# Patient Record
Sex: MALE | Race: WHITE | Employment: OTHER | URBAN - NONMETROPOLITAN AREA
[De-identification: names, ages, dates, MRNs, and addresses within clinical notes are randomized per-mention and may not be internally consistent; named-entity substitution may affect disease eponyms.]

---

## 2023-02-21 ENCOUNTER — OFFICE VISIT (OUTPATIENT)
Dept: FAMILY MEDICINE CLINIC | Age: 73
End: 2023-02-21
Payer: MEDICARE

## 2023-02-21 VITALS
HEART RATE: 103 BPM | OXYGEN SATURATION: 95 % | HEIGHT: 68 IN | TEMPERATURE: 98.2 F | RESPIRATION RATE: 18 BRPM | SYSTOLIC BLOOD PRESSURE: 182 MMHG | BODY MASS INDEX: 24.1 KG/M2 | WEIGHT: 159 LBS | DIASTOLIC BLOOD PRESSURE: 110 MMHG

## 2023-02-21 DIAGNOSIS — R09.81 SINUS CONGESTION: Primary | ICD-10-CM

## 2023-02-21 DIAGNOSIS — I16.0 HYPERTENSIVE URGENCY: ICD-10-CM

## 2023-02-21 DIAGNOSIS — R51.9 ACUTE NONINTRACTABLE HEADACHE, UNSPECIFIED HEADACHE TYPE: ICD-10-CM

## 2023-02-21 PROCEDURE — 1036F TOBACCO NON-USER: CPT | Performed by: PHYSICIAN ASSISTANT

## 2023-02-21 PROCEDURE — G8484 FLU IMMUNIZE NO ADMIN: HCPCS | Performed by: PHYSICIAN ASSISTANT

## 2023-02-21 PROCEDURE — 1123F ACP DISCUSS/DSCN MKR DOCD: CPT | Performed by: PHYSICIAN ASSISTANT

## 2023-02-21 PROCEDURE — 99205 OFFICE O/P NEW HI 60 MIN: CPT | Performed by: PHYSICIAN ASSISTANT

## 2023-02-21 PROCEDURE — G8427 DOCREV CUR MEDS BY ELIG CLIN: HCPCS | Performed by: PHYSICIAN ASSISTANT

## 2023-02-21 PROCEDURE — G8420 CALC BMI NORM PARAMETERS: HCPCS | Performed by: PHYSICIAN ASSISTANT

## 2023-02-21 PROCEDURE — 3080F DIAST BP >= 90 MM HG: CPT | Performed by: PHYSICIAN ASSISTANT

## 2023-02-21 PROCEDURE — 3017F COLORECTAL CA SCREEN DOC REV: CPT | Performed by: PHYSICIAN ASSISTANT

## 2023-02-21 PROCEDURE — 3077F SYST BP >= 140 MM HG: CPT | Performed by: PHYSICIAN ASSISTANT

## 2023-02-21 RX ORDER — LISINOPRIL 20 MG/1
TABLET ORAL
COMMUNITY

## 2023-02-21 RX ORDER — GEMFIBROZIL 600 MG/1
TABLET, FILM COATED ORAL
COMMUNITY
Start: 2009-03-02

## 2023-02-21 RX ORDER — MONTELUKAST SODIUM 10 MG/1
TABLET ORAL
COMMUNITY
Start: 2016-03-23

## 2023-02-21 RX ORDER — FLUTICASONE PROPIONATE 50 MCG
SPRAY, SUSPENSION (ML) NASAL
COMMUNITY
Start: 2015-06-03

## 2023-02-21 RX ORDER — ATORVASTATIN CALCIUM 40 MG/1
TABLET, FILM COATED ORAL
COMMUNITY
Start: 2009-03-02

## 2023-02-21 RX ORDER — AMOXICILLIN 875 MG/1
875 TABLET, COATED ORAL 2 TIMES DAILY
Qty: 20 TABLET | Refills: 0 | Status: SHIPPED | OUTPATIENT
Start: 2023-02-21 | End: 2023-03-03

## 2023-02-21 ASSESSMENT — ENCOUNTER SYMPTOMS
PHOTOPHOBIA: 0
SINUS PRESSURE: 1
SHORTNESS OF BREATH: 0
DIARRHEA: 0
NAUSEA: 0
ABDOMINAL PAIN: 0
VOMITING: 0
BACK PAIN: 0
SORE THROAT: 0
COUGH: 0

## 2023-02-21 NOTE — PROGRESS NOTES
23  Thu Morton : 1950 Sex: male  Age 67 y.o. Subjective:  Chief Complaint   Patient presents with    Sinus Problem         79-year-old male presents to the walk-in clinic for evaluation of possible sinus infection. He states that he gets them regularly but has been doing well this winter because he started using a cool-mist humidifier. He states for 2 weeks has had sinus congestion pressure and drainage. He is taking over-the-counter Steffi-[place]'s day and night for sinuses. He is also using Flonase. Patient does have extremely high blood pressure in the office during triage. He states that he is not taken his blood pressure medication in several years. Prior to that he had been on lisinopril 20 mg daily. He currently does not have a family physician. He does admit to having a headache for the last 2 weeks. He denies visual disturbances. No lateralizing weakness. No shortness of breath or chest pain. Review of Systems   Constitutional:  Negative for chills and fever. HENT:  Positive for congestion and sinus pressure. Negative for ear pain and sore throat. Eyes:  Negative for photophobia and visual disturbance. Respiratory:  Negative for cough and shortness of breath. Cardiovascular:  Negative for chest pain. Gastrointestinal:  Negative for abdominal pain, diarrhea, nausea and vomiting. Genitourinary:  Negative for difficulty urinating, dysuria, frequency and urgency. Musculoskeletal:  Negative for back pain, neck pain and neck stiffness. Skin:  Negative for rash. Neurological:  Positive for headaches. Negative for dizziness, syncope, weakness and light-headedness. Hematological:  Negative for adenopathy. Does not bruise/bleed easily. Psychiatric/Behavioral:  Negative for agitation and confusion. All other systems reviewed and are negative. PMH:   History reviewed. No pertinent past medical history. History reviewed.  No pertinent surgical history. History reviewed. No pertinent family history. Medications:     Current Outpatient Medications:     OMEPRAZOLE PO, Take by mouth, Disp: , Rfl:     fluticasone (FLONASE) 50 MCG/ACT nasal spray, , Disp: , Rfl:     amoxicillin (AMOXIL) 875 MG tablet, Take 1 tablet by mouth 2 times daily for 10 days, Disp: 20 tablet, Rfl: 0    atorvastatin (LIPITOR) 40 MG tablet, Take by mouth (Patient not taking: Reported on 2/21/2023), Disp: , Rfl:     gemfibrozil (LOPID) 600 MG tablet, Take by mouth (Patient not taking: Reported on 2/21/2023), Disp: , Rfl:     lisinopril (PRINIVIL;ZESTRIL) 20 MG tablet, Take by mouth (Patient not taking: Reported on 2/21/2023), Disp: , Rfl:     montelukast (SINGULAIR) 10 MG tablet, , Disp: , Rfl:     Allergies:   No Known Allergies    Social History:     Social History     Tobacco Use    Smoking status: Never    Smokeless tobacco: Never       Patient lives at home. Physical Exam:     Vitals:    02/21/23 1346 02/21/23 1353   BP: (!) 174/106 (!) 182/110   Pulse: (!) 103    Resp: 18    Temp: 98.2 °F (36.8 °C)    TempSrc: Temporal    SpO2: 95%    Weight: 159 lb (72.1 kg)    Height: 5' 8\" (1.727 m)        Exam:  Physical Exam  Vitals and nursing note reviewed. Constitutional:       General: He is not in acute distress. Appearance: He is well-developed. HENT:      Head: Normocephalic and atraumatic. Right Ear: Tympanic membrane normal.      Left Ear: Tympanic membrane normal.      Nose: Nose normal.      Mouth/Throat:      Mouth: Mucous membranes are moist.      Pharynx: Oropharynx is clear. Eyes:      Conjunctiva/sclera: Conjunctivae normal.      Pupils: Pupils are equal, round, and reactive to light. Cardiovascular:      Rate and Rhythm: Normal rate and regular rhythm. Pulmonary:      Effort: Pulmonary effort is normal. No respiratory distress. Breath sounds: Normal breath sounds.    Abdominal:      General: Bowel sounds are normal.      Palpations: Abdomen is soft.      Tenderness: There is no abdominal tenderness. Musculoskeletal:         General: Normal range of motion. Cervical back: Normal range of motion. No rigidity. Lymphadenopathy:      Cervical: No cervical adenopathy. Skin:     General: Skin is warm and dry. Neurological:      General: No focal deficit present. Mental Status: He is alert and oriented to person, place, and time. Mental status is at baseline. Psychiatric:         Mood and Affect: Mood normal.         Behavior: Behavior normal.         Thought Content: Thought content normal.         Judgment: Judgment normal.         Testing:           Medical Decision Making:     Patient upon arrival did not appear toxic or lethargic. Vital signs were reviewed. Past medical history reviewed. Allergies reviewed. Medications reviewed. Patient is presenting with the above complaint of sinus congestion. Differential diagnosis was discussed with the patient. We will address his sinus congestion today in the office. Patient will be given a prescription for amoxicillin. Patient may use over-the-counter analgesics and decongestants that are safe for high blood pressure as needed. During triage patient's blood pressure was significantly elevated. Repeat value was obtained after he had been sitting and resting for several minutes with a similar value. Patient was instructed that with his symptoms of headache with his high blood pressure he will need to go directly to the emergency department for more emergent evaluation. Patient understands and is agreeable. He does not want transport by EMS. Patient currently does not have a primary care physician. He was instructed to call in and establish for ER follow-up and blood pressure monitoring. He understands the risks of driving by private vehicle as well as not going to the emergency department including MVA, injury, decompensation, disability and death.       Clinical Impression:   Srini Cross was seen today for sinus problem. Diagnoses and all orders for this visit:    Sinus congestion    Hypertensive urgency    Acute nonintractable headache, unspecified headache type    Other orders  -     amoxicillin (AMOXIL) 875 MG tablet; Take 1 tablet by mouth 2 times daily for 10 days      Go directly to the emergency department. SIGNATURE: Ana Wray PA-C

## 2023-10-16 ENCOUNTER — APPOINTMENT (OUTPATIENT)
Dept: GENERAL RADIOLOGY | Age: 73
End: 2023-10-16
Payer: MEDICARE

## 2023-10-16 ENCOUNTER — APPOINTMENT (OUTPATIENT)
Dept: CT IMAGING | Age: 73
End: 2023-10-16
Payer: MEDICARE

## 2023-10-16 ENCOUNTER — HOSPITAL ENCOUNTER (OUTPATIENT)
Age: 73
Setting detail: OBSERVATION
Discharge: HOME | End: 2023-10-17
Attending: STUDENT IN AN ORGANIZED HEALTH CARE EDUCATION/TRAINING PROGRAM | Admitting: INTERNAL MEDICINE
Payer: MEDICARE

## 2023-10-16 DIAGNOSIS — R00.0 TACHYCARDIA: Primary | ICD-10-CM

## 2023-10-16 LAB
ALBUMIN SERPL-MCNC: 4.7 G/DL (ref 3.5–5.2)
ALP SERPL-CCNC: 57 U/L (ref 40–129)
ALT SERPL-CCNC: 22 U/L (ref 0–40)
AMPHET UR QL SCN: NEGATIVE
ANION GAP SERPL CALCULATED.3IONS-SCNC: 12 MMOL/L (ref 7–16)
ANION GAP SERPL CALCULATED.3IONS-SCNC: 12 MMOL/L (ref 7–16)
AST SERPL-CCNC: 30 U/L (ref 0–39)
B PARAP IS1001 DNA NPH QL NAA+NON-PROBE: NOT DETECTED
B PERT DNA SPEC QL NAA+PROBE: NOT DETECTED
BARBITURATES UR QL SCN: NEGATIVE
BASOPHILS # BLD: 0.02 K/UL (ref 0–0.2)
BASOPHILS NFR BLD: 0 % (ref 0–2)
BENZODIAZ UR QL: NEGATIVE
BILIRUB SERPL-MCNC: 0.6 MG/DL (ref 0–1.2)
BILIRUB UR QL STRIP: NEGATIVE
BNP SERPL-MCNC: 1376 PG/ML (ref 0–125)
BUN SERPL-MCNC: 14 MG/DL (ref 6–23)
BUN SERPL-MCNC: 17 MG/DL (ref 6–23)
BUPRENORPHINE UR QL: NEGATIVE
C PNEUM DNA NPH QL NAA+NON-PROBE: NOT DETECTED
CALCIUM SERPL-MCNC: 10 MG/DL (ref 8.6–10.2)
CALCIUM SERPL-MCNC: 9.6 MG/DL (ref 8.6–10.2)
CANNABINOIDS UR QL SCN: NEGATIVE
CHLORIDE SERPL-SCNC: 105 MMOL/L (ref 98–107)
CHLORIDE SERPL-SCNC: 98 MMOL/L (ref 98–107)
CHOLEST SERPL-MCNC: 221 MG/DL
CLARITY UR: CLEAR
CO2 SERPL-SCNC: 21 MMOL/L (ref 22–29)
CO2 SERPL-SCNC: 25 MMOL/L (ref 22–29)
COCAINE UR QL SCN: NEGATIVE
COLOR UR: YELLOW
COMMENT: ABNORMAL
CREAT SERPL-MCNC: 0.8 MG/DL (ref 0.7–1.2)
CREAT SERPL-MCNC: 1 MG/DL (ref 0.7–1.2)
D DIMER: 323 NG/ML DDU (ref 0–232)
EKG ATRIAL RATE: 120 BPM
EKG P AXIS: 64 DEGREES
EKG P-R INTERVAL: 142 MS
EKG Q-T INTERVAL: 328 MS
EKG QRS DURATION: 108 MS
EKG QTC CALCULATION (BAZETT): 463 MS
EKG R AXIS: 78 DEGREES
EKG T AXIS: 40 DEGREES
EKG VENTRICULAR RATE: 120 BPM
EOSINOPHIL # BLD: 0.04 K/UL (ref 0.05–0.5)
EOSINOPHILS RELATIVE PERCENT: 0 % (ref 0–6)
ERYTHROCYTE [DISTWIDTH] IN BLOOD BY AUTOMATED COUNT: 12.1 % (ref 11.5–15)
FENTANYL UR QL: NEGATIVE
FLUAV RNA NPH QL NAA+NON-PROBE: NOT DETECTED
FLUBV RNA NPH QL NAA+NON-PROBE: NOT DETECTED
GFR SERPL CREATININE-BSD FRML MDRD: >60 ML/MIN/1.73M2
GFR SERPL CREATININE-BSD FRML MDRD: >60 ML/MIN/1.73M2
GLUCOSE SERPL-MCNC: 105 MG/DL (ref 74–99)
GLUCOSE SERPL-MCNC: 90 MG/DL (ref 74–99)
GLUCOSE UR STRIP-MCNC: NEGATIVE MG/DL
HADV DNA NPH QL NAA+NON-PROBE: NOT DETECTED
HBA1C MFR BLD: 4.7 % (ref 4–5.6)
HCOV 229E RNA NPH QL NAA+NON-PROBE: NOT DETECTED
HCOV HKU1 RNA NPH QL NAA+NON-PROBE: NOT DETECTED
HCOV NL63 RNA NPH QL NAA+NON-PROBE: NOT DETECTED
HCOV OC43 RNA NPH QL NAA+NON-PROBE: NOT DETECTED
HCT VFR BLD AUTO: 45.9 % (ref 37–54)
HDLC SERPL-MCNC: 94 MG/DL
HGB BLD-MCNC: 15.9 G/DL (ref 12.5–16.5)
HGB UR QL STRIP.AUTO: NEGATIVE
HMPV RNA NPH QL NAA+NON-PROBE: NOT DETECTED
HPIV1 RNA NPH QL NAA+NON-PROBE: NOT DETECTED
HPIV2 RNA NPH QL NAA+NON-PROBE: NOT DETECTED
HPIV3 RNA NPH QL NAA+NON-PROBE: NOT DETECTED
HPIV4 RNA NPH QL NAA+NON-PROBE: NOT DETECTED
IMM GRANULOCYTES # BLD AUTO: 0.06 K/UL (ref 0–0.58)
IMM GRANULOCYTES NFR BLD: 1 % (ref 0–5)
KETONES UR STRIP-MCNC: ABNORMAL MG/DL
LDLC SERPL CALC-MCNC: 111 MG/DL
LEUKOCYTE ESTERASE UR QL STRIP: NEGATIVE
LYMPHOCYTES NFR BLD: 1.27 K/UL (ref 1.5–4)
LYMPHOCYTES RELATIVE PERCENT: 10 % (ref 20–42)
M PNEUMO DNA NPH QL NAA+NON-PROBE: NOT DETECTED
MAGNESIUM SERPL-MCNC: 1.9 MG/DL (ref 1.6–2.6)
MCH RBC QN AUTO: 33.5 PG (ref 26–35)
MCHC RBC AUTO-ENTMCNC: 34.6 G/DL (ref 32–34.5)
MCV RBC AUTO: 96.8 FL (ref 80–99.9)
METHADONE UR QL: NEGATIVE
MONOCYTES NFR BLD: 0.73 K/UL (ref 0.1–0.95)
MONOCYTES NFR BLD: 6 % (ref 2–12)
NEUTROPHILS NFR BLD: 83 % (ref 43–80)
NEUTS SEG NFR BLD: 10.61 K/UL (ref 1.8–7.3)
NITRITE UR QL STRIP: NEGATIVE
OPIATES UR QL SCN: NEGATIVE
OXYCODONE UR QL SCN: NEGATIVE
PCP UR QL SCN: NEGATIVE
PH UR STRIP: 6 [PH] (ref 5–9)
PLATELET # BLD AUTO: 258 K/UL (ref 130–450)
PMV BLD AUTO: 9.3 FL (ref 7–12)
POTASSIUM SERPL-SCNC: 4.4 MMOL/L (ref 3.5–5)
POTASSIUM SERPL-SCNC: 5.6 MMOL/L (ref 3.5–5)
PROT SERPL-MCNC: 7.7 G/DL (ref 6.4–8.3)
PROT UR STRIP-MCNC: NEGATIVE MG/DL
RBC # BLD AUTO: 4.74 M/UL (ref 3.8–5.8)
RSV RNA NPH QL NAA+NON-PROBE: NOT DETECTED
RV+EV RNA NPH QL NAA+NON-PROBE: NOT DETECTED
SARS-COV-2 RNA NPH QL NAA+NON-PROBE: NOT DETECTED
SODIUM SERPL-SCNC: 135 MMOL/L (ref 132–146)
SODIUM SERPL-SCNC: 138 MMOL/L (ref 132–146)
SP GR UR STRIP: 1.01 (ref 1–1.03)
SPECIMEN DESCRIPTION: NORMAL
T4 FREE SERPL-MCNC: 1.2 NG/DL (ref 0.9–1.7)
TEST INFORMATION: NORMAL
TRIGL SERPL-MCNC: 78 MG/DL
TROPONIN I SERPL HS-MCNC: 12 NG/L (ref 0–11)
TROPONIN I SERPL HS-MCNC: 13 NG/L (ref 0–11)
TSH SERPL DL<=0.05 MIU/L-ACNC: 1.9 UIU/ML (ref 0.27–4.2)
UROBILINOGEN UR STRIP-ACNC: 0.2 EU/DL (ref 0–1)
VLDLC SERPL CALC-MCNC: 16 MG/DL
WBC OTHER # BLD: 12.7 K/UL (ref 4.5–11.5)

## 2023-10-16 PROCEDURE — 2580000003 HC RX 258: Performed by: STUDENT IN AN ORGANIZED HEALTH CARE EDUCATION/TRAINING PROGRAM

## 2023-10-16 PROCEDURE — 80307 DRUG TEST PRSMV CHEM ANLYZR: CPT

## 2023-10-16 PROCEDURE — 85025 COMPLETE CBC W/AUTO DIFF WBC: CPT

## 2023-10-16 PROCEDURE — G0378 HOSPITAL OBSERVATION PER HR: HCPCS

## 2023-10-16 PROCEDURE — 81003 URINALYSIS AUTO W/O SCOPE: CPT

## 2023-10-16 PROCEDURE — 6360000002 HC RX W HCPCS: Performed by: INTERNAL MEDICINE

## 2023-10-16 PROCEDURE — 96372 THER/PROPH/DIAG INJ SC/IM: CPT

## 2023-10-16 PROCEDURE — 6360000004 HC RX CONTRAST MEDICATION: Performed by: RADIOLOGY

## 2023-10-16 PROCEDURE — 84443 ASSAY THYROID STIM HORMONE: CPT

## 2023-10-16 PROCEDURE — 6370000000 HC RX 637 (ALT 250 FOR IP): Performed by: INTERNAL MEDICINE

## 2023-10-16 PROCEDURE — 83880 ASSAY OF NATRIURETIC PEPTIDE: CPT

## 2023-10-16 PROCEDURE — 96365 THER/PROPH/DIAG IV INF INIT: CPT

## 2023-10-16 PROCEDURE — 80061 LIPID PANEL: CPT

## 2023-10-16 PROCEDURE — 83036 HEMOGLOBIN GLYCOSYLATED A1C: CPT

## 2023-10-16 PROCEDURE — 2580000003 HC RX 258: Performed by: INTERNAL MEDICINE

## 2023-10-16 PROCEDURE — 96366 THER/PROPH/DIAG IV INF ADDON: CPT

## 2023-10-16 PROCEDURE — 2500000003 HC RX 250 WO HCPCS: Performed by: STUDENT IN AN ORGANIZED HEALTH CARE EDUCATION/TRAINING PROGRAM

## 2023-10-16 PROCEDURE — 80053 COMPREHEN METABOLIC PANEL: CPT

## 2023-10-16 PROCEDURE — 99223 1ST HOSP IP/OBS HIGH 75: CPT | Performed by: INTERNAL MEDICINE

## 2023-10-16 PROCEDURE — 80048 BASIC METABOLIC PNL TOTAL CA: CPT

## 2023-10-16 PROCEDURE — 71275 CT ANGIOGRAPHY CHEST: CPT

## 2023-10-16 PROCEDURE — 0202U NFCT DS 22 TRGT SARS-COV-2: CPT

## 2023-10-16 PROCEDURE — 84484 ASSAY OF TROPONIN QUANT: CPT

## 2023-10-16 PROCEDURE — 99285 EMERGENCY DEPT VISIT HI MDM: CPT

## 2023-10-16 PROCEDURE — 83735 ASSAY OF MAGNESIUM: CPT

## 2023-10-16 PROCEDURE — 96361 HYDRATE IV INFUSION ADD-ON: CPT

## 2023-10-16 PROCEDURE — 93005 ELECTROCARDIOGRAM TRACING: CPT | Performed by: STUDENT IN AN ORGANIZED HEALTH CARE EDUCATION/TRAINING PROGRAM

## 2023-10-16 PROCEDURE — 84439 ASSAY OF FREE THYROXINE: CPT

## 2023-10-16 PROCEDURE — 71045 X-RAY EXAM CHEST 1 VIEW: CPT

## 2023-10-16 PROCEDURE — 85379 FIBRIN DEGRADATION QUANT: CPT

## 2023-10-16 PROCEDURE — 93010 ELECTROCARDIOGRAM REPORT: CPT | Performed by: INTERNAL MEDICINE

## 2023-10-16 PROCEDURE — 99222 1ST HOSP IP/OBS MODERATE 55: CPT | Performed by: INTERNAL MEDICINE

## 2023-10-16 PROCEDURE — 96375 TX/PRO/DX INJ NEW DRUG ADDON: CPT

## 2023-10-16 RX ORDER — LORAZEPAM 2 MG/ML
2 INJECTION INTRAMUSCULAR
Status: DISCONTINUED | OUTPATIENT
Start: 2023-10-16 | End: 2023-10-17 | Stop reason: HOSPADM

## 2023-10-16 RX ORDER — SODIUM CHLORIDE 9 MG/ML
INJECTION, SOLUTION INTRAVENOUS PRN
Status: DISCONTINUED | OUTPATIENT
Start: 2023-10-16 | End: 2023-10-17 | Stop reason: HOSPADM

## 2023-10-16 RX ORDER — ACETAMINOPHEN 650 MG/1
650 SUPPOSITORY RECTAL EVERY 6 HOURS PRN
Status: DISCONTINUED | OUTPATIENT
Start: 2023-10-16 | End: 2023-10-17 | Stop reason: HOSPADM

## 2023-10-16 RX ORDER — SODIUM CHLORIDE 9 MG/ML
INJECTION, SOLUTION INTRAVENOUS CONTINUOUS
Status: DISCONTINUED | OUTPATIENT
Start: 2023-10-16 | End: 2023-10-16

## 2023-10-16 RX ORDER — CALCIUM GLUCONATE 20 MG/ML
1000 INJECTION, SOLUTION INTRAVENOUS ONCE
Status: COMPLETED | OUTPATIENT
Start: 2023-10-16 | End: 2023-10-16

## 2023-10-16 RX ORDER — LORAZEPAM 1 MG/1
4 TABLET ORAL
Status: DISCONTINUED | OUTPATIENT
Start: 2023-10-16 | End: 2023-10-17 | Stop reason: HOSPADM

## 2023-10-16 RX ORDER — METOPROLOL TARTRATE 50 MG/1
50 TABLET, FILM COATED ORAL 2 TIMES DAILY
Status: DISCONTINUED | OUTPATIENT
Start: 2023-10-16 | End: 2023-10-17 | Stop reason: HOSPADM

## 2023-10-16 RX ORDER — LORAZEPAM 1 MG/1
1 TABLET ORAL
Status: DISCONTINUED | OUTPATIENT
Start: 2023-10-16 | End: 2023-10-17 | Stop reason: HOSPADM

## 2023-10-16 RX ORDER — LORAZEPAM 2 MG/ML
4 INJECTION INTRAMUSCULAR
Status: DISCONTINUED | OUTPATIENT
Start: 2023-10-16 | End: 2023-10-17 | Stop reason: HOSPADM

## 2023-10-16 RX ORDER — ONDANSETRON 2 MG/ML
4 INJECTION INTRAMUSCULAR; INTRAVENOUS EVERY 6 HOURS PRN
Status: DISCONTINUED | OUTPATIENT
Start: 2023-10-16 | End: 2023-10-17 | Stop reason: HOSPADM

## 2023-10-16 RX ORDER — POLYETHYLENE GLYCOL 3350 17 G/17G
17 POWDER, FOR SOLUTION ORAL DAILY PRN
Status: DISCONTINUED | OUTPATIENT
Start: 2023-10-16 | End: 2023-10-17 | Stop reason: HOSPADM

## 2023-10-16 RX ORDER — METOPROLOL SUCCINATE 25 MG/1
25 TABLET, EXTENDED RELEASE ORAL DAILY
Status: DISCONTINUED | OUTPATIENT
Start: 2023-10-16 | End: 2023-10-16

## 2023-10-16 RX ORDER — LORAZEPAM 1 MG/1
3 TABLET ORAL
Status: DISCONTINUED | OUTPATIENT
Start: 2023-10-16 | End: 2023-10-17 | Stop reason: HOSPADM

## 2023-10-16 RX ORDER — ONDANSETRON 4 MG/1
4 TABLET, ORALLY DISINTEGRATING ORAL EVERY 8 HOURS PRN
Status: DISCONTINUED | OUTPATIENT
Start: 2023-10-16 | End: 2023-10-17 | Stop reason: HOSPADM

## 2023-10-16 RX ORDER — 0.9 % SODIUM CHLORIDE 0.9 %
1000 INTRAVENOUS SOLUTION INTRAVENOUS ONCE
Status: COMPLETED | OUTPATIENT
Start: 2023-10-16 | End: 2023-10-16

## 2023-10-16 RX ORDER — SODIUM CHLORIDE 0.9 % (FLUSH) 0.9 %
5-40 SYRINGE (ML) INJECTION PRN
Status: DISCONTINUED | OUTPATIENT
Start: 2023-10-16 | End: 2023-10-17 | Stop reason: HOSPADM

## 2023-10-16 RX ORDER — SODIUM CHLORIDE 0.9 % (FLUSH) 0.9 %
5-40 SYRINGE (ML) INJECTION EVERY 12 HOURS SCHEDULED
Status: DISCONTINUED | OUTPATIENT
Start: 2023-10-16 | End: 2023-10-17 | Stop reason: HOSPADM

## 2023-10-16 RX ORDER — CALCIUM GLUCONATE 20 MG/ML
1000 INJECTION, SOLUTION INTRAVENOUS ONCE
Status: DISCONTINUED | OUTPATIENT
Start: 2023-10-16 | End: 2023-10-16

## 2023-10-16 RX ORDER — LABETALOL HYDROCHLORIDE 5 MG/ML
5 INJECTION, SOLUTION INTRAVENOUS EVERY 4 HOURS PRN
Status: DISCONTINUED | OUTPATIENT
Start: 2023-10-16 | End: 2023-10-17 | Stop reason: HOSPADM

## 2023-10-16 RX ORDER — ENOXAPARIN SODIUM 100 MG/ML
40 INJECTION SUBCUTANEOUS DAILY
Status: DISCONTINUED | OUTPATIENT
Start: 2023-10-16 | End: 2023-10-17 | Stop reason: HOSPADM

## 2023-10-16 RX ORDER — ACETAMINOPHEN 325 MG/1
650 TABLET ORAL EVERY 6 HOURS PRN
Status: DISCONTINUED | OUTPATIENT
Start: 2023-10-16 | End: 2023-10-17 | Stop reason: HOSPADM

## 2023-10-16 RX ORDER — LORAZEPAM 2 MG/ML
1 INJECTION INTRAMUSCULAR
Status: DISCONTINUED | OUTPATIENT
Start: 2023-10-16 | End: 2023-10-17 | Stop reason: HOSPADM

## 2023-10-16 RX ORDER — PANTOPRAZOLE SODIUM 40 MG/1
40 TABLET, DELAYED RELEASE ORAL
Status: DISCONTINUED | OUTPATIENT
Start: 2023-10-17 | End: 2023-10-17 | Stop reason: HOSPADM

## 2023-10-16 RX ORDER — LORAZEPAM 1 MG/1
2 TABLET ORAL
Status: DISCONTINUED | OUTPATIENT
Start: 2023-10-16 | End: 2023-10-17 | Stop reason: HOSPADM

## 2023-10-16 RX ORDER — LORAZEPAM 2 MG/ML
3 INJECTION INTRAMUSCULAR
Status: DISCONTINUED | OUTPATIENT
Start: 2023-10-16 | End: 2023-10-17 | Stop reason: HOSPADM

## 2023-10-16 RX ADMIN — SODIUM CHLORIDE 1000 ML: 9 INJECTION, SOLUTION INTRAVENOUS at 07:53

## 2023-10-16 RX ADMIN — LABETALOL HYDROCHLORIDE 5 MG: 5 INJECTION INTRAVENOUS at 18:47

## 2023-10-16 RX ADMIN — METOPROLOL TARTRATE 50 MG: 50 TABLET, FILM COATED ORAL at 20:56

## 2023-10-16 RX ADMIN — SODIUM CHLORIDE 1000 ML: 9 INJECTION, SOLUTION INTRAVENOUS at 08:51

## 2023-10-16 RX ADMIN — CALCIUM GLUCONATE 1000 MG: 20 INJECTION, SOLUTION INTRAVENOUS at 09:15

## 2023-10-16 RX ADMIN — SODIUM CHLORIDE 1000 ML: 9 INJECTION, SOLUTION INTRAVENOUS at 14:45

## 2023-10-16 RX ADMIN — SODIUM CHLORIDE, PRESERVATIVE FREE 10 ML: 5 INJECTION INTRAVENOUS at 20:56

## 2023-10-16 RX ADMIN — ENOXAPARIN SODIUM 40 MG: 100 INJECTION SUBCUTANEOUS at 17:02

## 2023-10-16 RX ADMIN — IOPAMIDOL 75 ML: 755 INJECTION, SOLUTION INTRAVENOUS at 09:29

## 2023-10-16 ASSESSMENT — PAIN - FUNCTIONAL ASSESSMENT
PAIN_FUNCTIONAL_ASSESSMENT: NONE - DENIES PAIN
PAIN_FUNCTIONAL_ASSESSMENT: NONE - DENIES PAIN

## 2023-10-16 ASSESSMENT — LIFESTYLE VARIABLES
HOW MANY STANDARD DRINKS CONTAINING ALCOHOL DO YOU HAVE ON A TYPICAL DAY: 1 OR 2
HOW OFTEN DO YOU HAVE A DRINK CONTAINING ALCOHOL: 2-3 TIMES A WEEK

## 2023-10-16 NOTE — CONSULTS
Inpatient Cardiology Consultation      Reason for Consult:  Persistent Tachycardia    Consulting Physician: Dr Carey Estrada    Requesting Physician:  Dr Alexander Pimentel    Date of Consultation: 10/16/2023    HISTORY OF PRESENT ILLNESS: 67 yo  male  not previously known to any cardiologist. PCP retired and has not seen a PCP for @ least 5 years. Uses Urgent Care when he has any issues. Stopped taking his medications when his prescriptions ran out 5 years ago. PMH: HTN, HLD, Lipitor caused myalgias, GERD, and lifelong non-smoker. Was awoke around midnight with the feeling of his heart racing and lightheaded. Denied any CP,  or SOB @ that time. Got up sat in chair and it seemed to calm down. When getting ready to come to ED heart racing returned, lightheaded. Drove self to [facility] ED 10/16/2023 /98 -120's ST, afebrile, and O2 saturation 96% on RA. Troponin 12>13, K+ 5.6>4.4, Na 138, Bun/Cr 14/0.8, LFT's WNL, TSH 1.90, WBC 12.7, Hgb 15.9, Plt 258, D-dimer 323, UA trace ketones. ED medications: 2 liters NSS, 1 gram calcium gluconate  A third liter of NSS ordered      Currently /77  ST, with ambulation 's ST, no hypoxia with ambulation, and O2 saturation 92-93% on RA. Patient up to bathroom during interview and HR increased to 120-130's ST. Please note: past medical records were reviewed per electronic medical record (EMR) - see detailed reports under Past Medical/ Surgical History. Past Medical History:   HTN  HLD  Lipitor caused myalgias  GERD   60 pack year smoker quit in 2009  Lifelong non-smoker  Medical non-compliance  Denies DM, PUD, DVT/PE, CAD, CVA, COPD, or emphysema    Past Surgical History:      Medications Prior to admit:  Prior to Admission medications    Medication Sig Start Date End Date Taking?  Authorizing Provider   OMEPRAZOLE PO Take by mouth 9/15/15   Provider, MD Felipe   atorvastatin (LIPITOR) 40 MG tablet Take by mouth  Patient not

## 2023-10-16 NOTE — ED PROVIDER NOTES
Index  [BB] Ana Jean-Baptiste,         Chronic Conditions: History reviewed. No pertinent past medical history. CONSULTS: (Who and What was discussed)  IP CONSULT TO CARDIOLOGY    Discussion with Other Profesionals : Admitting Team Dr. Heron Schilder and Consultant Dr. Carey Estrada    Social Determinants : None    Records Reviewed : None    CC/HPI Summary, DDx, ED Course, and Reassessment: Patient is placed on cardiac monitor and continuous pulse ox for monitoring. EKG is ordered to evaluate patient's current cardiac rhythm, and to interpret QT interval prior to administering medications. CBC is ordered to evaluate for any signs of infection or inflammation by obtaining a WBC count, or any signs of acute anemia by interpreting hemoglobin. CMP was ordered to evaluate for any electrolyte imbalances, kidney function, hepatic injury or any elevations in anion gap. Troponin ordered to evaluate for possible cardiac etiology of symptoms including but not limited to STEMI or NSTEMI. Urinalysis ordered to evaluate for a UTI and/or hematuria. Viral swabs are ordered to evaluate possible viral etiology of symptoms. Thyroid studies ordered to evaluate for hyperthyroidism or hypothyroidism. D-dimer was ordered to evaluate for any elevations which may point to vascular thrombus as etiology of symptoms. Chest x-ray is ordered to evaluate for any possible signs of, but without limitation to, pneumonia, pleural effusions, cardiomegaly, pneumothorax, atelectasis, rib or sternal abnormalities including fractures. CTA chest with contrast ordered to evaluate for, but without limitation to, pulmonary embolism, effusions, pneumonia, dissection or acute bony fractures. Patient initially given 1 L normal saline bolus. CBC revealed mild leukocytosis of white count of 12.7. BMP revealed hyperkalemia with potassium of 5.6 and he was given additional 1 L normal saline bolus and calcium. Initial troponin was 12 with a repeat of 13.   D-dimer was elevated

## 2023-10-16 NOTE — ED NOTES
Prior to ambulation hr 120, O2 95%. During ambulation hr 130's, O2 90-93%. Pt denies shortness of breath or any other complaints during ambulation.      Patty Mann RN  10/16/23 [place] Mann RN  10/16/23 5931

## 2023-10-17 VITALS
OXYGEN SATURATION: 94 % | SYSTOLIC BLOOD PRESSURE: 188 MMHG | RESPIRATION RATE: 18 BRPM | TEMPERATURE: 98.2 F | BODY MASS INDEX: 24.63 KG/M2 | HEIGHT: 68 IN | HEART RATE: 75 BPM | DIASTOLIC BLOOD PRESSURE: 88 MMHG | WEIGHT: 162.5 LBS

## 2023-10-17 LAB
ALBUMIN SERPL-MCNC: 3.8 G/DL (ref 3.5–5.2)
ALP SERPL-CCNC: 53 U/L (ref 40–129)
ALT SERPL-CCNC: 15 U/L (ref 0–40)
ANION GAP SERPL CALCULATED.3IONS-SCNC: 12 MMOL/L (ref 7–16)
AST SERPL-CCNC: 23 U/L (ref 0–39)
BASOPHILS # BLD: 0.02 K/UL (ref 0–0.2)
BASOPHILS NFR BLD: 0 % (ref 0–2)
BILIRUB SERPL-MCNC: 0.9 MG/DL (ref 0–1.2)
BUN SERPL-MCNC: 10 MG/DL (ref 6–23)
CALCIUM SERPL-MCNC: 9.2 MG/DL (ref 8.6–10.2)
CHLORIDE SERPL-SCNC: 102 MMOL/L (ref 98–107)
CO2 SERPL-SCNC: 25 MMOL/L (ref 22–29)
CREAT SERPL-MCNC: 0.9 MG/DL (ref 0.7–1.2)
EKG ATRIAL RATE: 80 BPM
EKG P AXIS: 69 DEGREES
EKG P-R INTERVAL: 174 MS
EKG Q-T INTERVAL: 410 MS
EKG QRS DURATION: 114 MS
EKG QTC CALCULATION (BAZETT): 472 MS
EKG R AXIS: 74 DEGREES
EKG T AXIS: 61 DEGREES
EKG VENTRICULAR RATE: 80 BPM
EOSINOPHIL # BLD: 0.1 K/UL (ref 0.05–0.5)
EOSINOPHILS RELATIVE PERCENT: 1 % (ref 0–6)
ERYTHROCYTE [DISTWIDTH] IN BLOOD BY AUTOMATED COUNT: 12.3 % (ref 11.5–15)
GFR SERPL CREATININE-BSD FRML MDRD: >60 ML/MIN/1.73M2
GLUCOSE SERPL-MCNC: 98 MG/DL (ref 74–99)
HCT VFR BLD AUTO: 42.3 % (ref 37–54)
HGB BLD-MCNC: 14.1 G/DL (ref 12.5–16.5)
IMM GRANULOCYTES # BLD AUTO: <0.03 K/UL (ref 0–0.58)
IMM GRANULOCYTES NFR BLD: 0 % (ref 0–5)
LYMPHOCYTES NFR BLD: 1.17 K/UL (ref 1.5–4)
LYMPHOCYTES RELATIVE PERCENT: 17 % (ref 20–42)
MAGNESIUM SERPL-MCNC: 1.8 MG/DL (ref 1.6–2.6)
MCH RBC QN AUTO: 33.3 PG (ref 26–35)
MCHC RBC AUTO-ENTMCNC: 33.3 G/DL (ref 32–34.5)
MCV RBC AUTO: 100 FL (ref 80–99.9)
MONOCYTES NFR BLD: 0.53 K/UL (ref 0.1–0.95)
MONOCYTES NFR BLD: 8 % (ref 2–12)
NEUTROPHILS NFR BLD: 74 % (ref 43–80)
NEUTS SEG NFR BLD: 5.2 K/UL (ref 1.8–7.3)
PLATELET # BLD AUTO: 200 K/UL (ref 130–450)
PMV BLD AUTO: 10 FL (ref 7–12)
POTASSIUM SERPL-SCNC: 4.1 MMOL/L (ref 3.5–5)
PROT SERPL-MCNC: 6.4 G/DL (ref 6.4–8.3)
RBC # BLD AUTO: 4.23 M/UL (ref 3.8–5.8)
SODIUM SERPL-SCNC: 139 MMOL/L (ref 132–146)
WBC OTHER # BLD: 7 K/UL (ref 4.5–11.5)

## 2023-10-17 PROCEDURE — 99232 SBSQ HOSP IP/OBS MODERATE 35: CPT | Performed by: INTERNAL MEDICINE

## 2023-10-17 PROCEDURE — 80053 COMPREHEN METABOLIC PANEL: CPT

## 2023-10-17 PROCEDURE — 6370000000 HC RX 637 (ALT 250 FOR IP): Performed by: INTERNAL MEDICINE

## 2023-10-17 PROCEDURE — 85025 COMPLETE CBC W/AUTO DIFF WBC: CPT

## 2023-10-17 PROCEDURE — 96372 THER/PROPH/DIAG INJ SC/IM: CPT

## 2023-10-17 PROCEDURE — 6360000002 HC RX W HCPCS: Performed by: INTERNAL MEDICINE

## 2023-10-17 PROCEDURE — 93306 TTE W/DOPPLER COMPLETE: CPT

## 2023-10-17 PROCEDURE — 96376 TX/PRO/DX INJ SAME DRUG ADON: CPT

## 2023-10-17 PROCEDURE — 2580000003 HC RX 258: Performed by: INTERNAL MEDICINE

## 2023-10-17 PROCEDURE — G0378 HOSPITAL OBSERVATION PER HR: HCPCS

## 2023-10-17 PROCEDURE — 36415 COLL VENOUS BLD VENIPUNCTURE: CPT

## 2023-10-17 PROCEDURE — 83735 ASSAY OF MAGNESIUM: CPT

## 2023-10-17 PROCEDURE — 99239 HOSP IP/OBS DSCHRG MGMT >30: CPT | Performed by: INTERNAL MEDICINE

## 2023-10-17 RX ORDER — METOPROLOL TARTRATE 50 MG/1
50 TABLET, FILM COATED ORAL 2 TIMES DAILY
Qty: 60 TABLET | Refills: 3 | Status: SHIPPED | OUTPATIENT
Start: 2023-10-17

## 2023-10-17 RX ORDER — AMLODIPINE BESYLATE 5 MG/1
5 TABLET ORAL DAILY
Status: DISCONTINUED | OUTPATIENT
Start: 2023-10-17 | End: 2023-10-17 | Stop reason: HOSPADM

## 2023-10-17 RX ORDER — LISINOPRIL 10 MG/1
10 TABLET ORAL DAILY
Status: DISCONTINUED | OUTPATIENT
Start: 2023-10-17 | End: 2023-10-17 | Stop reason: HOSPADM

## 2023-10-17 RX ORDER — AMLODIPINE BESYLATE 5 MG/1
5 TABLET ORAL DAILY
Qty: 30 TABLET | Refills: 3 | Status: SHIPPED | OUTPATIENT
Start: 2023-10-18

## 2023-10-17 RX ORDER — LISINOPRIL 10 MG/1
10 TABLET ORAL DAILY
Qty: 30 TABLET | Refills: 3 | Status: SHIPPED | OUTPATIENT
Start: 2023-10-18

## 2023-10-17 RX ADMIN — LABETALOL HYDROCHLORIDE 5 MG: 5 INJECTION INTRAVENOUS at 01:49

## 2023-10-17 RX ADMIN — PANTOPRAZOLE SODIUM 40 MG: 40 TABLET, DELAYED RELEASE ORAL at 05:48

## 2023-10-17 RX ADMIN — SODIUM CHLORIDE, PRESERVATIVE FREE 10 ML: 5 INJECTION INTRAVENOUS at 08:50

## 2023-10-17 RX ADMIN — LABETALOL HYDROCHLORIDE 5 MG: 5 INJECTION INTRAVENOUS at 05:48

## 2023-10-17 RX ADMIN — ENOXAPARIN SODIUM 40 MG: 100 INJECTION SUBCUTANEOUS at 08:10

## 2023-10-17 RX ADMIN — SODIUM CHLORIDE, PRESERVATIVE FREE 10 ML: 5 INJECTION INTRAVENOUS at 08:28

## 2023-10-17 RX ADMIN — AMLODIPINE BESYLATE 5 MG: 5 TABLET ORAL at 11:17

## 2023-10-17 RX ADMIN — METOPROLOL TARTRATE 50 MG: 50 TABLET, FILM COATED ORAL at 08:10

## 2023-10-17 RX ADMIN — LABETALOL HYDROCHLORIDE 5 MG: 5 INJECTION INTRAVENOUS at 16:02

## 2023-10-17 RX ADMIN — LISINOPRIL 10 MG: 10 TABLET ORAL at 08:28

## 2023-10-17 NOTE — DISCHARGE SUMMARY
[facility] Physician Discharge Summary       No follow-up provider specified. Activity level: As tolerated     Dispo: Home      Condition on discharge: Stable     Patient ID:  Romana Loosen  [id number]  5 y.o.  1950    Admit date: 10/16/2023    Discharge date and time:  10/17/2023  5:03 PM    Admission Diagnoses: Principal Problem:    Tachycardia  Resolved Problems:    * No resolved hospital problems. *      Discharge Diagnoses: Principal Problem:    Tachycardia  Resolved Problems:    * No resolved hospital problems. *      Consults:  IP CONSULT TO CARDIOLOGY  IP CONSULT TO IV TEAM    Hospital Course:   Patient Romana Loosen is a 68 y.o. presented with Sinus tachycardia [R00.0]    This is a 68year old male who presents to ER with tachycardia, felt like heart was racing. States improvement with with rest, increased with ambulation. Denies medical history, denies other complaints. Has not seen a doctor in 5 years and stopped taking prescriptions at that time. On presentation, patient was tachycardic and hypertensive. Labs remarkable for CO2 21, Troponin 13, WBC 12.7, D dimer 323, UA + Trace ketones. Cardiology consulted. CTA negative for PE. CXR negative. Viral panel pending. Decision to admit. Cardiology was consulted for elevated BP and tachycardia. Patient was started on metoprolol with improvement in heart rate. Echo was also done which was unrevealing. Patient SBP improved to 160s on the day of discharge. He will be discharged on metoprolol, amlodipine, and lisinopril. He is to follow-up with cardiology in 1 month and to establish with a PCP. He is discharged today in stable medical condition.     Discharge Exam:    General Appearance: alert and oriented to person, place and time and in no acute distress  Skin: warm and dry  Head: normocephalic and atraumatic  Eyes: pupils equal, round, and reactive to light, extraocular eye movements intact, conjunctivae normal  Neck: neck

## 2023-10-17 NOTE — CARE COORDINATION
Pt independent from home alone. Has family locally; admitted for tachycardia. + ETOH; on CIWA scale. Offered PRS services; pt declines. States has no PCP; pre-service # on AVS. Cardiology following. Plan is home at discharge, no needs. family can transport. Michael Mccloud.

## 2023-10-17 NOTE — DISCHARGE INSTRUCTIONS
Activity as tolerated    Be compliant with your medications and take them as prescribed. Diet: low sodium    Special Instructions:   Please take metoprolol twice daily  Please take lisinopril and amlodipine once daily  Establish with PCP  Continue your other home medications as prescribed, no other changes made      Other Follow-Ups:  Cardiology in 1 month      Other than any new prescriptions given to you today, the list of home going meds on this After Visit Summary are based on information provided to us from you. This information, including the list, dose, and frequency of medications is only as accurate as the information you provided. If you have any questions or concerns about your home  medications, please contact your Primary Care Physician for further clarification. Information obtained by:   By signing below, I understand that if any problems occur once I leave the hospital I am to contact [email or web address]. I understand and acknowledge receipt of the instruction indicated above.

## 2023-10-17 NOTE — ACP (ADVANCE CARE PLANNING)
Advance Care Planning   Healthcare Decision Maker:    Primary Decision Maker: [facility] - [phone]    Erik Washington.

## 2023-11-29 ENCOUNTER — OFFICE VISIT (OUTPATIENT)
Dept: FAMILY MEDICINE CLINIC | Age: 73
End: 2023-11-29
Payer: MEDICARE

## 2023-11-29 VITALS
HEIGHT: 68 IN | WEIGHT: 150 LBS | BODY MASS INDEX: 22.73 KG/M2 | SYSTOLIC BLOOD PRESSURE: 142 MMHG | OXYGEN SATURATION: 97 % | HEART RATE: 88 BPM | TEMPERATURE: 97.5 F | RESPIRATION RATE: 16 BRPM | DIASTOLIC BLOOD PRESSURE: 80 MMHG

## 2023-11-29 DIAGNOSIS — J06.9 UPPER RESPIRATORY TRACT INFECTION, UNSPECIFIED TYPE: Primary | ICD-10-CM

## 2023-11-29 PROCEDURE — 99213 OFFICE O/P EST LOW 20 MIN: CPT | Performed by: PHYSICIAN ASSISTANT

## 2023-11-29 PROCEDURE — 1123F ACP DISCUSS/DSCN MKR DOCD: CPT | Performed by: PHYSICIAN ASSISTANT

## 2023-11-29 RX ORDER — DOXYCYCLINE HYCLATE 100 MG
100 TABLET ORAL 2 TIMES DAILY
Qty: 20 TABLET | Refills: 0 | Status: SHIPPED | OUTPATIENT
Start: 2023-11-29 | End: 2023-12-09

## 2023-11-29 SDOH — ECONOMIC STABILITY: HOUSING INSECURITY
IN THE LAST 12 MONTHS, WAS THERE A TIME WHEN YOU DID NOT HAVE A STEADY PLACE TO SLEEP OR SLEPT IN A SHELTER (INCLUDING NOW)?: NO

## 2023-11-29 SDOH — ECONOMIC STABILITY: INCOME INSECURITY: HOW HARD IS IT FOR YOU TO PAY FOR THE VERY BASICS LIKE FOOD, HOUSING, MEDICAL CARE, AND HEATING?: NOT HARD AT ALL

## 2023-11-29 SDOH — ECONOMIC STABILITY: FOOD INSECURITY: WITHIN THE PAST 12 MONTHS, THE FOOD YOU BOUGHT JUST DIDN'T LAST AND YOU DIDN'T HAVE MONEY TO GET MORE.: NEVER TRUE

## 2023-11-29 SDOH — ECONOMIC STABILITY: FOOD INSECURITY: WITHIN THE PAST 12 MONTHS, YOU WORRIED THAT YOUR FOOD WOULD RUN OUT BEFORE YOU GOT MONEY TO BUY MORE.: NEVER TRUE

## 2023-11-29 ASSESSMENT — ENCOUNTER SYMPTOMS
COUGH: 1
BACK PAIN: 0
SORE THROAT: 0
ABDOMINAL PAIN: 0
SHORTNESS OF BREATH: 0
DIARRHEA: 0
PHOTOPHOBIA: 0
VOMITING: 0
NAUSEA: 0

## 2023-11-29 NOTE — PROGRESS NOTES
23  Timmy Hopson : 1950 Sex: male  Age 68 y.o. Subjective:  Chief Complaint   Patient presents with    Congestion    Cough         77-year-old male presents to the walk-in clinic for evaluation of congestion and cough with sputum production for the past 7 days. Patient is taking over-the-counter Steffi-[place] plus. He denies any fever, chills, nausea or vomiting. No shortness of breath or chest pain. He denies any known sick contacts. Patient states that he is prone to getting sinus infections about this time of the year. Review of Systems   Constitutional:  Negative for chills and fever. HENT:  Positive for congestion. Negative for ear pain and sore throat. Eyes:  Negative for photophobia and visual disturbance. Respiratory:  Positive for cough. Negative for shortness of breath. Cardiovascular:  Negative for chest pain. Gastrointestinal:  Negative for abdominal pain, diarrhea, nausea and vomiting. Genitourinary:  Negative for difficulty urinating, dysuria, frequency and urgency. Musculoskeletal:  Negative for back pain, neck pain and neck stiffness. Skin:  Negative for rash. Neurological:  Negative for dizziness, syncope, weakness, light-headedness and headaches. Hematological:  Negative for adenopathy. Does not bruise/bleed easily. Psychiatric/Behavioral:  Negative for agitation and confusion. All other systems reviewed and are negative. PMH:   History reviewed. No pertinent past medical history. History reviewed. No pertinent surgical history. History reviewed. No pertinent family history.     Medications:     Current Outpatient Medications:     doxycycline hyclate (VIBRA-TABS) 100 MG tablet, Take 1 tablet by mouth 2 times daily for 10 days, Disp: 20 tablet, Rfl: 0    lisinopril (PRINIVIL;ZESTRIL) 10 MG tablet, Take 1 tablet by mouth daily, Disp: 30 tablet, Rfl: 3    metoprolol tartrate (LOPRESSOR) 50 MG tablet, Take 1 tablet by mouth 2 times

## 2024-01-18 ENCOUNTER — OFFICE VISIT (OUTPATIENT)
Dept: PRIMARY CARE CLINIC | Age: 74
End: 2024-01-18

## 2024-01-18 VITALS
BODY MASS INDEX: 22.28 KG/M2 | RESPIRATION RATE: 18 BRPM | HEIGHT: 68 IN | DIASTOLIC BLOOD PRESSURE: 70 MMHG | HEART RATE: 42 BPM | TEMPERATURE: 98.3 F | WEIGHT: 147 LBS | SYSTOLIC BLOOD PRESSURE: 152 MMHG | OXYGEN SATURATION: 94 %

## 2024-01-18 DIAGNOSIS — L57.0 ACTINIC KERATOSIS: ICD-10-CM

## 2024-01-18 DIAGNOSIS — I10 ESSENTIAL HYPERTENSION: Primary | ICD-10-CM

## 2024-01-18 DIAGNOSIS — R06.02 SOB (SHORTNESS OF BREATH) ON EXERTION: ICD-10-CM

## 2024-01-18 DIAGNOSIS — Z87.891 PERSONAL HISTORY OF TOBACCO USE: ICD-10-CM

## 2024-01-18 RX ORDER — AMLODIPINE BESYLATE 10 MG/1
10 TABLET ORAL DAILY
Qty: 30 TABLET | Refills: 5 | Status: SHIPPED | OUTPATIENT
Start: 2024-01-18 | End: 2024-07-16

## 2024-01-18 RX ORDER — LISINOPRIL 10 MG/1
10 TABLET ORAL DAILY
Qty: 30 TABLET | Refills: 3
Start: 2024-01-18

## 2024-01-18 RX ORDER — METOPROLOL TARTRATE 50 MG/1
50 TABLET, FILM COATED ORAL 2 TIMES DAILY
Qty: 60 TABLET | Refills: 3
Start: 2024-01-18

## 2024-01-18 RX ORDER — ALBUTEROL SULFATE 90 UG/1
2 AEROSOL, METERED RESPIRATORY (INHALATION) 4 TIMES DAILY PRN
Qty: 18 G | Refills: 0 | Status: SHIPPED | OUTPATIENT
Start: 2024-01-18

## 2024-01-18 ASSESSMENT — ANXIETY QUESTIONNAIRES
IF YOU CHECKED OFF ANY PROBLEMS ON THIS QUESTIONNAIRE, HOW DIFFICULT HAVE THESE PROBLEMS MADE IT FOR YOU TO DO YOUR WORK, TAKE CARE OF THINGS AT HOME, OR GET ALONG WITH OTHER PEOPLE: NOT DIFFICULT AT ALL
IF YOU CHECKED OFF ANY PROBLEMS ON THIS QUESTIONNAIRE, HOW DIFFICULT HAVE THESE PROBLEMS MADE IT FOR YOU TO DO YOUR WORK, TAKE CARE OF THINGS AT HOME, OR GET ALONG WITH OTHER PEOPLE: NOT DIFFICULT AT ALL
7. FEELING AFRAID AS IF SOMETHING AWFUL MIGHT HAPPEN: NOT AT ALL
6. BECOMING EASILY ANNOYED OR IRRITABLE: 0
1. FEELING NERVOUS, ANXIOUS, OR ON EDGE: 0
3. WORRYING TOO MUCH ABOUT DIFFERENT THINGS: NOT AT ALL
1. FEELING NERVOUS, ANXIOUS, OR ON EDGE: NOT AT ALL
GAD7 TOTAL SCORE: 0
6. BECOMING EASILY ANNOYED OR IRRITABLE: NOT AT ALL
5. BEING SO RESTLESS THAT IT IS HARD TO SIT STILL: 0
2. NOT BEING ABLE TO STOP OR CONTROL WORRYING: NOT AT ALL
4. TROUBLE RELAXING: 0
4. TROUBLE RELAXING: NOT AT ALL
3. WORRYING TOO MUCH ABOUT DIFFERENT THINGS: 0
7. FEELING AFRAID AS IF SOMETHING AWFUL MIGHT HAPPEN: 0
2. NOT BEING ABLE TO STOP OR CONTROL WORRYING: 0
5. BEING SO RESTLESS THAT IT IS HARD TO SIT STILL: NOT AT ALL

## 2024-01-18 ASSESSMENT — PATIENT HEALTH QUESTIONNAIRE - PHQ9
SUM OF ALL RESPONSES TO PHQ QUESTIONS 1-9: 0
1. LITTLE INTEREST OR PLEASURE IN DOING THINGS: 0
SUM OF ALL RESPONSES TO PHQ9 QUESTIONS 1 & 2: 0
2. FEELING DOWN, DEPRESSED OR HOPELESS: 0
SUM OF ALL RESPONSES TO PHQ QUESTIONS 1-9: 0

## 2024-01-18 ASSESSMENT — LIFESTYLE VARIABLES
HOW MANY STANDARD DRINKS CONTAINING ALCOHOL DO YOU HAVE ON A TYPICAL DAY: 2
HOW MANY STANDARD DRINKS CONTAINING ALCOHOL DO YOU HAVE ON A TYPICAL DAY: 3 OR 4
HOW OFTEN DO YOU HAVE SIX OR MORE DRINKS ON ONE OCCASION: 1
HOW OFTEN DO YOU HAVE A DRINK CONTAINING ALCOHOL: 2-4 TIMES A MONTH
HOW OFTEN DO YOU HAVE A DRINK CONTAINING ALCOHOL: 3

## 2024-01-18 NOTE — PROGRESS NOTES
Disp-1 each, R-2Normal  -     albuterol sulfate HFA (VENTOLIN HFA) 108 (90 Base) MCG/ACT inhaler; Inhale 2 puffs into the lungs 4 times daily as needed for Wheezing, Disp-18 g, R-0Normal  -     Full PFT Study With Bronchodilator; Future  3. tobacco use  Comments:  Obtain LDCT  Orders:  -     DE VISIT TO DISCUSS LUNG CA SCREEN W LDCT  -     CT Lung Screen (Initial/Annual/Baseline); Future  4. Actinic keratosis  Comments:  Will refer to dermatology  Orders:  -     Del Coombs DO, Dermatology, [facility] ([facility])       Return in about 2 months (around 3/18/2024).    This note may have been created using dictation software. Efforts were made to reduce grammatical or syntax errors, but some may persist.     Carter Donaldson, MDDiscussed with the patient the current USPSTF guidelines released March 9, 2021 for screening for lung cancer.    For adults aged 50 to 80 years who have a 20 pack-year smoking history and currently smoke or have quit within the past 15 years the grade B recommendation is to:  Screen for lung cancer with low-dose computed tomography (LDCT) every year.  Stop screening once a person has not smoked for 15 years or has a health problem that limits life expectancy or the ability to have lung surgery.    The patient  reports that he quit smoking about 12 years ago. His smoking use included cigarettes. He started smoking about 52 years ago. He has a 80.0 pack-year smoking history. He has never used smokeless tobacco.. Discussed with patient the risks and benefits of screening, including over-diagnosis, false positive rate, and total radiation exposure.  The patient currently exhibits no signs or symptoms suggestive of lung cancer.  Discussed with patient the importance of compliance with yearly annual lung cancer screenings and willingness to undergo diagnosis and treatment if screening scan is positive.  In addition, the patient was counseled regarding the importance of remaining smoke free and/or total

## 2024-02-12 DIAGNOSIS — I10 ESSENTIAL HYPERTENSION: ICD-10-CM

## 2024-02-12 RX ORDER — LISINOPRIL 10 MG/1
10 TABLET ORAL DAILY
Qty: 30 TABLET | Refills: 3 | OUTPATIENT
Start: 2024-02-12

## 2024-02-12 RX ORDER — AMLODIPINE BESYLATE 5 MG/1
5 TABLET ORAL DAILY
Qty: 30 TABLET | Refills: 3 | OUTPATIENT
Start: 2024-02-12

## 2024-02-12 RX ORDER — METOPROLOL TARTRATE 50 MG/1
50 TABLET, FILM COATED ORAL 2 TIMES DAILY
Qty: 60 TABLET | Refills: 3 | OUTPATIENT
Start: 2024-02-12

## 2024-02-22 ENCOUNTER — HOSPITAL ENCOUNTER (OUTPATIENT)
Dept: CT IMAGING | Age: 74
Discharge: HOME | End: 2024-02-24
Payer: MEDICARE

## 2024-02-22 ENCOUNTER — HOSPITAL ENCOUNTER (OUTPATIENT)
Dept: PULMONOLOGY | Age: 74
Discharge: HOME | End: 2024-02-22
Payer: MEDICARE

## 2024-02-22 DIAGNOSIS — R06.02 SOB (SHORTNESS OF BREATH) ON EXERTION: ICD-10-CM

## 2024-02-22 DIAGNOSIS — Z87.891 PERSONAL HISTORY OF TOBACCO USE: ICD-10-CM

## 2024-02-22 PROCEDURE — 71271 CT THORAX LUNG CANCER SCR C-: CPT

## 2024-02-22 PROCEDURE — 94729 DIFFUSING CAPACITY: CPT

## 2024-02-22 PROCEDURE — 94726 PLETHYSMOGRAPHY LUNG VOLUMES: CPT

## 2024-02-22 PROCEDURE — 94060 EVALUATION OF WHEEZING: CPT

## 2024-02-23 ENCOUNTER — TELEPHONE (OUTPATIENT)
Dept: CASE MANAGEMENT | Age: 74
End: 2024-02-23

## 2024-02-23 DIAGNOSIS — J44.9 COPD WITHOUT EXACERBATION (HCC): Primary | ICD-10-CM

## 2024-02-23 NOTE — TELEPHONE ENCOUNTER
No call, encounter opened to process CT Lung Screening.    CT Lung Screen: 2/22/2024    IMPRESSION:  1. There is no pulmonary infiltrate, mass or suspicious pulmonary nodule  2. Emphysematous changes     LUNG RADS:  Lung-RADS 1 - Negative (v2022)     Management:  12 month screening LDCT     RECOMMENDATIONS:  If you would like to register your patient with the [facility] Lung Nodule/Lung  Cancer Screening Program, please contact the Nurse Navigator at  [phone].    Pack years: 80    Social History     Tobacco Use  Smoking Status: Former Smoker    Start Date: 1972   Quit Date: 2012   Types: Cigarettes   Packs/Day: 2   Years: 40   Pack Years: 80   Smokeless Tobacco: Never         Results letter sent to patient via my chart or mailed.     John Alves  Imaging Navigator   [facility]   [phone]

## 2024-03-14 ENCOUNTER — OFFICE VISIT (OUTPATIENT)
Dept: PRIMARY CARE CLINIC | Age: 74
End: 2024-03-14
Payer: MEDICARE

## 2024-03-14 VITALS
TEMPERATURE: 98 F | WEIGHT: 147 LBS | BODY MASS INDEX: 22.28 KG/M2 | OXYGEN SATURATION: 91 % | SYSTOLIC BLOOD PRESSURE: 136 MMHG | DIASTOLIC BLOOD PRESSURE: 72 MMHG | RESPIRATION RATE: 18 BRPM | HEIGHT: 68 IN | HEART RATE: 102 BPM

## 2024-03-14 DIAGNOSIS — J44.9 COPD WITHOUT EXACERBATION (HCC): Primary | ICD-10-CM

## 2024-03-14 DIAGNOSIS — I10 BENIGN ESSENTIAL HYPERTENSION: ICD-10-CM

## 2024-03-14 PROCEDURE — 1123F ACP DISCUSS/DSCN MKR DOCD: CPT | Performed by: STUDENT IN AN ORGANIZED HEALTH CARE EDUCATION/TRAINING PROGRAM

## 2024-03-14 PROCEDURE — 99214 OFFICE O/P EST MOD 30 MIN: CPT | Performed by: STUDENT IN AN ORGANIZED HEALTH CARE EDUCATION/TRAINING PROGRAM

## 2024-03-14 PROCEDURE — 3078F DIAST BP <80 MM HG: CPT | Performed by: STUDENT IN AN ORGANIZED HEALTH CARE EDUCATION/TRAINING PROGRAM

## 2024-03-14 PROCEDURE — 3075F SYST BP GE 130 - 139MM HG: CPT | Performed by: STUDENT IN AN ORGANIZED HEALTH CARE EDUCATION/TRAINING PROGRAM

## 2024-03-14 RX ORDER — ALBUTEROL SULFATE 90 UG/1
2 AEROSOL, METERED RESPIRATORY (INHALATION) 4 TIMES DAILY PRN
Qty: 18 G | Refills: 0 | Status: SHIPPED | OUTPATIENT
Start: 2024-03-14

## 2024-03-14 RX ORDER — BUDESONIDE AND FORMOTEROL FUMARATE DIHYDRATE 160; 4.5 UG/1; UG/1
2 AEROSOL RESPIRATORY (INHALATION) 2 TIMES DAILY
Qty: 30.6 G | Refills: 2 | Status: SHIPPED | OUTPATIENT
Start: 2024-03-14

## 2024-03-14 NOTE — PROGRESS NOTES
Return in about 4 weeks (around 4/11/2024) for COPD check new medication.    This note may have been created using dictation software. Efforts were made to reduce grammatical or syntax errors, but some may persist.     Carter Donaldson MD

## 2024-04-09 ENCOUNTER — OFFICE VISIT (OUTPATIENT)
Dept: FAMILY MEDICINE CLINIC | Age: 74
End: 2024-04-09
Payer: MEDICARE

## 2024-04-09 VITALS
BODY MASS INDEX: 21.98 KG/M2 | OXYGEN SATURATION: 92 % | TEMPERATURE: 99.2 F | HEART RATE: 108 BPM | HEIGHT: 68 IN | RESPIRATION RATE: 18 BRPM | WEIGHT: 145 LBS | SYSTOLIC BLOOD PRESSURE: 138 MMHG | DIASTOLIC BLOOD PRESSURE: 86 MMHG

## 2024-04-09 DIAGNOSIS — J40 SINOBRONCHITIS: Primary | ICD-10-CM

## 2024-04-09 DIAGNOSIS — J44.1 COPD WITH EXACERBATION (HCC): ICD-10-CM

## 2024-04-09 DIAGNOSIS — J32.9 SINOBRONCHITIS: Primary | ICD-10-CM

## 2024-04-09 PROCEDURE — 3079F DIAST BP 80-89 MM HG: CPT | Performed by: NURSE PRACTITIONER

## 2024-04-09 PROCEDURE — 99213 OFFICE O/P EST LOW 20 MIN: CPT | Performed by: NURSE PRACTITIONER

## 2024-04-09 PROCEDURE — 3075F SYST BP GE 130 - 139MM HG: CPT | Performed by: NURSE PRACTITIONER

## 2024-04-09 PROCEDURE — 1123F ACP DISCUSS/DSCN MKR DOCD: CPT | Performed by: NURSE PRACTITIONER

## 2024-04-09 RX ORDER — AMOXICILLIN AND CLAVULANATE POTASSIUM 875; 125 MG/1; MG/1
1 TABLET, FILM COATED ORAL 2 TIMES DAILY
Qty: 20 TABLET | Refills: 0 | Status: SHIPPED | OUTPATIENT
Start: 2024-04-09 | End: 2024-04-19

## 2024-04-09 RX ORDER — PREDNISONE 20 MG/1
20 TABLET ORAL 3 TIMES DAILY
Qty: 15 TABLET | Refills: 0 | Status: SHIPPED | OUTPATIENT
Start: 2024-04-09 | End: 2024-04-14

## 2024-04-09 NOTE — PROGRESS NOTES
24  Patrick Padgett : 1950 Sex: male  Age 73 y.o.    Subjective:  Chief Complaint   Patient presents with    Congestion    Pharyngitis       HPI:   Patrick Padgett , 73 y.o. male presents to the clinic for evaluation of cough x 4 days. The patient also reports sinus congestion, sore throat, wheezing, RAE, body aches. The patient has taken Steffi selter Plus, Tylenol Sinus, Dayquil for symptoms. The patient reports worsening symptoms over time. The patient reports ill exposure. The patient denies headache, rash, and fever. The patient also denies chest pain, abdominal pain, and nausea / vomiting / diarrhea.    ROS:   Unless otherwise stated in this report the patient's positive and negative responses for review of systems for constitutional, eyes, ENT, cardiovascular, respiratory, gastrointestinal, neurological, , musculoskeletal, and integument systems and related systems to the presenting problem are either stated in the history of present illness or were not pertinent or were negative for the symptoms and/or complaints related to the presenting medical problem.  Positives and pertinent negatives as per HPI.  All others reviewed and are negative.      PMH:     Past Medical History:   Diagnosis Date    GERD (gastroesophageal reflux disease)     HTN (hypertension)        Past Surgical History:   Procedure Laterality Date    KIDNEY REMOVAL         Family History   Problem Relation Age of Onset    Alzheimer's Disease Mother     Heart Failure Father        Medications:     Current Outpatient Medications:     amoxicillin-clavulanate (AUGMENTIN) 875-125 MG per tablet, Take 1 tablet by mouth 2 times daily for 10 days, Disp: 20 tablet, Rfl: 0    predniSONE (DELTASONE) 20 MG tablet, Take 1 tablet by mouth 3 times daily for 5 days, Disp: 15 tablet, Rfl: 0    budesonide-formoterol (SYMBICORT) 160-4.5 MCG/ACT AERO, Inhale 2 puffs into the lungs 2 times daily, Disp: 30.6 g, Rfl: 2    tiotropium (SPIRIVA RESPIMAT) 2.5

## 2024-04-12 ENCOUNTER — OFFICE VISIT (OUTPATIENT)
Dept: FAMILY MEDICINE CLINIC | Age: 74
End: 2024-04-12
Payer: MEDICARE

## 2024-04-12 VITALS
HEIGHT: 68 IN | BODY MASS INDEX: 22.28 KG/M2 | HEART RATE: 74 BPM | SYSTOLIC BLOOD PRESSURE: 110 MMHG | WEIGHT: 147 LBS | OXYGEN SATURATION: 96 % | TEMPERATURE: 98.6 F | DIASTOLIC BLOOD PRESSURE: 80 MMHG

## 2024-04-12 DIAGNOSIS — I10 ESSENTIAL HYPERTENSION: ICD-10-CM

## 2024-04-12 DIAGNOSIS — K06.8 BLEEDING GUMS: ICD-10-CM

## 2024-04-12 DIAGNOSIS — J44.9 COPD WITHOUT EXACERBATION (HCC): Primary | ICD-10-CM

## 2024-04-12 LAB
ANION GAP SERPL CALCULATED.3IONS-SCNC: 10 MMOL/L (ref 7–16)
BUN BLDV-MCNC: 19 MG/DL (ref 6–23)
CALCIUM SERPL-MCNC: 10.3 MG/DL (ref 8.6–10.2)
CHLORIDE BLD-SCNC: 92 MMOL/L (ref 98–107)
CO2: 28 MMOL/L (ref 22–29)
CREAT SERPL-MCNC: 0.8 MG/DL (ref 0.7–1.2)
GFR SERPL CREATININE-BSD FRML MDRD: >90 ML/MIN/1.73M2
GLUCOSE BLD-MCNC: 104 MG/DL (ref 74–99)
HCT VFR BLD CALC: 45.2 % (ref 37–54)
HEMOGLOBIN: 14.7 G/DL (ref 12.5–16.5)
MCH RBC QN AUTO: 30.7 PG (ref 26–35)
MCHC RBC AUTO-ENTMCNC: 32.5 G/DL (ref 32–34.5)
MCV RBC AUTO: 94.4 FL (ref 80–99.9)
PDW BLD-RTO: 12.5 % (ref 11.5–15)
PLATELET # BLD: 363 K/UL (ref 130–450)
PMV BLD AUTO: 10 FL (ref 7–12)
POTASSIUM SERPL-SCNC: 5.3 MMOL/L (ref 3.5–5)
RBC # BLD: 4.79 M/UL (ref 3.8–5.8)
SODIUM BLD-SCNC: 130 MMOL/L (ref 132–146)
WBC # BLD: 7.6 K/UL (ref 4.5–11.5)

## 2024-04-12 PROCEDURE — 99214 OFFICE O/P EST MOD 30 MIN: CPT | Performed by: STUDENT IN AN ORGANIZED HEALTH CARE EDUCATION/TRAINING PROGRAM

## 2024-04-12 PROCEDURE — 3079F DIAST BP 80-89 MM HG: CPT | Performed by: STUDENT IN AN ORGANIZED HEALTH CARE EDUCATION/TRAINING PROGRAM

## 2024-04-12 PROCEDURE — 1123F ACP DISCUSS/DSCN MKR DOCD: CPT | Performed by: STUDENT IN AN ORGANIZED HEALTH CARE EDUCATION/TRAINING PROGRAM

## 2024-04-12 PROCEDURE — 3074F SYST BP LT 130 MM HG: CPT | Performed by: STUDENT IN AN ORGANIZED HEALTH CARE EDUCATION/TRAINING PROGRAM

## 2024-04-12 RX ORDER — LISINOPRIL 10 MG/1
10 TABLET ORAL DAILY
Qty: 90 TABLET | Refills: 1 | Status: SHIPPED | OUTPATIENT
Start: 2024-04-12 | End: 2024-10-09

## 2024-04-12 RX ORDER — AMLODIPINE BESYLATE 10 MG/1
10 TABLET ORAL DAILY
Qty: 90 TABLET | Refills: 1 | Status: SHIPPED | OUTPATIENT
Start: 2024-04-12 | End: 2024-10-09

## 2024-07-01 ENCOUNTER — OFFICE VISIT (OUTPATIENT)
Dept: FAMILY MEDICINE CLINIC | Age: 74
End: 2024-07-01
Payer: MEDICARE

## 2024-07-01 VITALS
BODY MASS INDEX: 23.79 KG/M2 | HEART RATE: 75 BPM | TEMPERATURE: 98 F | SYSTOLIC BLOOD PRESSURE: 148 MMHG | RESPIRATION RATE: 18 BRPM | DIASTOLIC BLOOD PRESSURE: 80 MMHG | WEIGHT: 157 LBS | HEIGHT: 68 IN | OXYGEN SATURATION: 98 %

## 2024-07-01 DIAGNOSIS — J01.90 ACUTE BACTERIAL SINUSITIS: Primary | ICD-10-CM

## 2024-07-01 DIAGNOSIS — B96.89 ACUTE BACTERIAL SINUSITIS: Primary | ICD-10-CM

## 2024-07-01 PROCEDURE — 3079F DIAST BP 80-89 MM HG: CPT | Performed by: NURSE PRACTITIONER

## 2024-07-01 PROCEDURE — 3077F SYST BP >= 140 MM HG: CPT | Performed by: NURSE PRACTITIONER

## 2024-07-01 PROCEDURE — 1123F ACP DISCUSS/DSCN MKR DOCD: CPT | Performed by: NURSE PRACTITIONER

## 2024-07-01 PROCEDURE — 99213 OFFICE O/P EST LOW 20 MIN: CPT | Performed by: NURSE PRACTITIONER

## 2024-07-01 RX ORDER — AMOXICILLIN AND CLAVULANATE POTASSIUM 875; 125 MG/1; MG/1
1 TABLET, FILM COATED ORAL 2 TIMES DAILY
Qty: 14 TABLET | Refills: 0 | Status: SHIPPED | OUTPATIENT
Start: 2024-07-01 | End: 2024-07-08

## 2024-07-01 NOTE — PROGRESS NOTES
Chief Complaint   Congestion and Cough      HPI   Source of history provided by: patient.      Patrick Padgett is a 73 y.o. old male who presents to walk-in care for evaluation of nasal congestion X 7 days. Associated symptoms include chills, headache, sore throat, sinus pressure, nasal congestion, rhinorrhea, and nausea.  Since onset symptoms have been about the same. Has taken mindy selzter sinus medication at home with some symptomatic relief. Denies wheezing, chest congestion, chest pain, shortness of breath, abdominal discomfort, vomiting, otalgia, and malaise. Pertinent PMH of: PMHpositive: COPD and HTN.  The patient has a history of tobacco abuse and has quit.    ROS   Pertinent positives and negatives are stated within HPI, all other systems reviewed and are negative.  Past Medical History:  has a past medical history of GERD (gastroesophageal reflux disease) and HTN (hypertension).  Surgical History:  has a past surgical history that includes Kidney removal.  Social History:  reports that he quit smoking about 12 years ago. His smoking use included cigarettes. He started smoking about 52 years ago. He has a 80.0 pack-year smoking history. He has never used smokeless tobacco. He reports that he does not currently use alcohol. He reports that he does not use drugs.  Family History: family history includes Alzheimer's Disease in his mother; Heart Failure in his father.  Allergies: Patient has no known allergies.    Physical Exam      VS:  BP (!) 148/80   Pulse 75   Temp 98 °F (36.7 °C)   Resp 18   Ht 1.727 m (5' 8\")   Wt 71.2 kg (157 lb)   SpO2 98%   BMI 23.87 kg/m²    Oxygen Saturation Interpretation: Normal.    Physical Exam  Vitals and nursing note reviewed.   Constitutional:       Appearance: Normal appearance. He is normal weight.   HENT:      Head: Normocephalic and atraumatic.      Right Ear: External ear normal.      Left Ear: External ear normal.      Nose: Congestion and rhinorrhea present.

## 2024-07-16 DIAGNOSIS — J44.9 COPD WITHOUT EXACERBATION (HCC): ICD-10-CM

## 2024-07-16 RX ORDER — BUDESONIDE AND FORMOTEROL FUMARATE DIHYDRATE 160; 4.5 UG/1; UG/1
2 AEROSOL RESPIRATORY (INHALATION) 2 TIMES DAILY
Qty: 30.6 G | Refills: 2 | Status: SHIPPED | OUTPATIENT
Start: 2024-07-16

## 2024-07-16 NOTE — TELEPHONE ENCOUNTER
Last Appointment:  4/12/2024  Future Appointments   Date Time Provider Department Center   10/18/2024  7:45 AM Carter Donaldson MD [facility]

## 2024-07-25 DIAGNOSIS — J44.9 COPD WITHOUT EXACERBATION (HCC): ICD-10-CM

## 2024-07-25 RX ORDER — ALBUTEROL SULFATE 90 UG/1
AEROSOL, METERED RESPIRATORY (INHALATION)
Qty: 8 G | Refills: 1 | Status: SHIPPED | OUTPATIENT
Start: 2024-07-25

## 2024-07-25 NOTE — TELEPHONE ENCOUNTER
Last Appointment:  4/12/2024    Future appts:  Future Appointments   Date Time Provider Department Center   10/18/2024  7:45 AM Carter Donaldson MD [facility]

## 2024-09-12 LAB — FECAL BLOOD IMMUNOCHEMICAL TEST: NEGATIVE

## 2024-10-08 ENCOUNTER — TELEPHONE (OUTPATIENT)
Dept: FAMILY MEDICINE CLINIC | Age: 74
End: 2024-10-08

## 2024-10-08 DIAGNOSIS — I10 ESSENTIAL HYPERTENSION: ICD-10-CM

## 2024-10-08 RX ORDER — AMLODIPINE BESYLATE 10 MG/1
10 TABLET ORAL DAILY
Qty: 90 TABLET | Refills: 1 | Status: SHIPPED | OUTPATIENT
Start: 2024-10-08 | End: 2025-04-06

## 2024-10-08 RX ORDER — LISINOPRIL 10 MG/1
10 TABLET ORAL DAILY
Qty: 90 TABLET | Refills: 1 | Status: SHIPPED | OUTPATIENT
Start: 2024-10-08 | End: 2025-04-06

## 2024-10-08 NOTE — TELEPHONE ENCOUNTER
Dr. Mendoza MD, patient out of refills. Rx(s) pended for your signature/modification as appropriate    Last Visit: 7/1/24  Next Visit: 10/18/24    Thank you,  Giovanna Boykin PharmD, [facility], [facility] Clinical Pharmacy  Department, toll free: [phone], option 1  =====================================================================    [facility] CLINICAL PHARMACY: ADHERENCE REVIEW  Identified care gap per [place]: fills at [facility]: ACE/ARB adherence    ASSESSMENT    ACE/ARB ADHERENCE    Insurance Records claims through 9/30/24 (Prior Year PDC = not reported; YTD PDC = 77%; Potential Fail Date: 10/14/24):   Lisinopril 10 mg tablets last filled on 7/3/24 for 90 day supply. Next refill due: 10/1/24  No refills remaining    BP Readings from Last 3 Encounters:   07/01/24 (!) 148/80   04/12/24 110/80   04/09/24 138/86     Estimated Creatinine Clearance: 78 mL/min (based on SCr of 0.8 mg/dL).  Lab Results   Component Value Date    CREATININE 0.8 04/12/2024     Lab Results   Component Value Date    K 5.3 (H) 04/12/2024       PLAN  The following are interventions that have been identified:   Patient OVERDUE refilling lisinopril and active on home medication list.   Patient NEEDS REFILLS for lisinopril    Will pend refill request to prescriber to [facility] pharmacy.    Last Visit: 7/1/24  Next Visit: 10/18/24  Future Appointments   Date Time Provider Department Center   10/18/2024  7:45 AM Carter Donaldson MD [facility]       Giovanna Boykin PharmD, [facility], [facility] Clinical Pharmacy  Department, toll free: [phone], option 1

## 2024-10-08 NOTE — TELEPHONE ENCOUNTER
Dr. Donaldson,    Patient returned call.  He states he has only been taking amlodipine since last visit in April 2024. States he misunderstood, thought he was  to only take one blood pressure medication and has been taking the amlodipine only. He has two full 90 day supply bottles lisinopril at home thought he was supposed to take when ran out of amlodipine.  He does not have a blood pressure monitor at home. States he has been working on improving lifestyle modifications and will discuss with you at upcoming appointment when get updated blood pressure.    Wanted to let you know the above information for upcoming appointment 10/18/24.  Future Appointments   Date Time Provider Department Center   10/18/2024  7:45 AM Carter Donaldson MD [facility]       Thank you!  Giovanna Boykin, PharmD, [facility]  Population Health  [facility] Clinical Pharmacy   Department, toll free: [phone], option 1

## 2024-10-18 ENCOUNTER — OFFICE VISIT (OUTPATIENT)
Dept: FAMILY MEDICINE CLINIC | Age: 74
End: 2024-10-18

## 2024-10-18 VITALS
SYSTOLIC BLOOD PRESSURE: 150 MMHG | HEART RATE: 94 BPM | OXYGEN SATURATION: 97 % | DIASTOLIC BLOOD PRESSURE: 62 MMHG | HEIGHT: 68 IN | WEIGHT: 164 LBS | TEMPERATURE: 97.3 F | BODY MASS INDEX: 24.86 KG/M2

## 2024-10-18 DIAGNOSIS — K21.9 GASTROESOPHAGEAL REFLUX DISEASE WITHOUT ESOPHAGITIS: Primary | ICD-10-CM

## 2024-10-18 DIAGNOSIS — J44.9 COPD WITHOUT EXACERBATION (HCC): ICD-10-CM

## 2024-10-18 DIAGNOSIS — Z00.00 INITIAL MEDICARE ANNUAL WELLNESS VISIT: ICD-10-CM

## 2024-10-18 DIAGNOSIS — I10 ESSENTIAL HYPERTENSION: ICD-10-CM

## 2024-10-18 RX ORDER — AMLODIPINE BESYLATE 10 MG/1
10 TABLET ORAL DAILY
Qty: 90 TABLET | Refills: 1 | Status: SHIPPED | OUTPATIENT
Start: 2024-10-18 | End: 2025-04-16

## 2024-10-18 RX ORDER — NICOTINE POLACRILEX 4 MG/1
20 GUM, CHEWING ORAL 2 TIMES DAILY
Qty: 180 TABLET | Refills: 1 | Status: SHIPPED | OUTPATIENT
Start: 2024-10-18

## 2024-10-18 RX ORDER — BUDESONIDE AND FORMOTEROL FUMARATE DIHYDRATE 160; 4.5 UG/1; UG/1
2 AEROSOL RESPIRATORY (INHALATION) 2 TIMES DAILY
Qty: 60.12 G | Refills: 2 | Status: SHIPPED | OUTPATIENT
Start: 2024-10-18

## 2024-10-18 ASSESSMENT — PATIENT HEALTH QUESTIONNAIRE - PHQ9
SUM OF ALL RESPONSES TO PHQ QUESTIONS 1-9: 0
1. LITTLE INTEREST OR PLEASURE IN DOING THINGS: NOT AT ALL
SUM OF ALL RESPONSES TO PHQ QUESTIONS 1-9: 0
SUM OF ALL RESPONSES TO PHQ QUESTIONS 1-9: 0
SUM OF ALL RESPONSES TO PHQ9 QUESTIONS 1 & 2: 0
2. FEELING DOWN, DEPRESSED OR HOPELESS: NOT AT ALL
SUM OF ALL RESPONSES TO PHQ QUESTIONS 1-9: 0

## 2024-10-18 ASSESSMENT — LIFESTYLE VARIABLES
HOW MANY STANDARD DRINKS CONTAINING ALCOHOL DO YOU HAVE ON A TYPICAL DAY: 3 OR 4
HOW OFTEN DO YOU HAVE A DRINK CONTAINING ALCOHOL: 2-4 TIMES A MONTH

## 2024-10-18 NOTE — PROGRESS NOTES
Medicare Annual Wellness Visit    Patrick Padgett is here for Medicare AWV (Discuss BP meds)    Assessment & Plan   Gastroesophageal reflux disease without esophagitis  Comments:  Stable continue omeprazole 20 mg twice daily.  Orders:  -     omeprazole 20 MG EC tablet; Take 1 tablet by mouth 2 times daily, Disp-180 tablet, R-1Normal  COPD without exacerbation (HCC)  Comments:  Stable at this time.  Continue Symbicort 2 puffs twice daily and Spiriva 2 puffs daily  Orders:  -     budesonide-formoterol (SYMBICORT) 160-4.5 MCG/ACT AERO; Inhale 2 puffs into the lungs 2 times daily, Disp-60.12 g, R-2Normal  Essential hypertension  Comments:  Stable.  Continue Norvasc 10 mg daily, metoprolol 50 mg twice daily and lisinopril 10 mg daily.  Orders:  -     amLODIPine (NORVASC) 10 MG tablet; Take 1 tablet by mouth daily, Disp-90 tablet, R-1Normal    Recommendations for Preventive Services Due: see orders and patient instructions/AVS.  Recommended screening schedule for the next 5-10 years is provided to the patient in written form: see Patient Instructions/AVS.     Return in about 3 months (around 1/18/2025) for HTN.     Subjective   The following acute and/or chronic problems were also addressed today:  Patient suffers from hypertension for which she is been taking most of his medications.  Patient did notice recently that he has not been taking his lisinopril as prescribed.  Reports going to restart the medication and monitor his blood pressure.  He denies any headaches, dizziness or blurry vision.  Patient additionally does suffer from COPD.  Denies any worsening shortness of breath and continues to use inhalers as prescribed.  Patient was previously referred to pulmonology.  Lastly for generalized heartburn patient does continue to take omeprazole 20 mg twice daily.  No worsening symptoms with this medication.        Patient's complete Health Risk Assessment and screening values have been reviewed and are found in Flowsheets.

## 2024-11-05 ENCOUNTER — TELEPHONE (OUTPATIENT)
Dept: FAMILY MEDICINE CLINIC | Age: 74
End: 2024-11-05

## 2024-11-05 NOTE — TELEPHONE ENCOUNTER
Called patient to discuss Lisinopril 10 mg. One daily due to ins. Notification  of medication adherence.      Pt states he is taking the medication daily.

## 2025-01-17 ENCOUNTER — OFFICE VISIT (OUTPATIENT)
Dept: FAMILY MEDICINE CLINIC | Age: 75
End: 2025-01-17

## 2025-01-17 VITALS
SYSTOLIC BLOOD PRESSURE: 128 MMHG | BODY MASS INDEX: 24.64 KG/M2 | DIASTOLIC BLOOD PRESSURE: 72 MMHG | WEIGHT: 157 LBS | TEMPERATURE: 98.7 F | HEIGHT: 67 IN | HEART RATE: 87 BPM | OXYGEN SATURATION: 98 %

## 2025-01-17 DIAGNOSIS — I10 ESSENTIAL HYPERTENSION: ICD-10-CM

## 2025-01-17 DIAGNOSIS — J44.9 COPD WITHOUT EXACERBATION (HCC): ICD-10-CM

## 2025-01-17 LAB
ALBUMIN: 4.3 G/DL (ref 3.5–5.2)
ALP BLD-CCNC: 56 U/L (ref 40–129)
ALT SERPL-CCNC: 18 U/L (ref 0–40)
ANION GAP SERPL CALCULATED.3IONS-SCNC: 11 MMOL/L (ref 7–16)
AST SERPL-CCNC: 25 U/L (ref 0–39)
BILIRUB SERPL-MCNC: 0.5 MG/DL (ref 0–1.2)
BUN BLDV-MCNC: 20 MG/DL (ref 6–23)
CALCIUM SERPL-MCNC: 9.8 MG/DL (ref 8.6–10.2)
CHLORIDE BLD-SCNC: 99 MMOL/L (ref 98–107)
CO2: 26 MMOL/L (ref 22–29)
CREAT SERPL-MCNC: 1 MG/DL (ref 0.7–1.2)
GFR, ESTIMATED: 78 ML/MIN/1.73M2
GLUCOSE BLD-MCNC: 97 MG/DL (ref 74–99)
HCT VFR BLD CALC: 45.1 % (ref 37–54)
HEMOGLOBIN: 14.9 G/DL (ref 12.5–16.5)
MCH RBC QN AUTO: 31.6 PG (ref 26–35)
MCHC RBC AUTO-ENTMCNC: 33 G/DL (ref 32–34.5)
MCV RBC AUTO: 95.8 FL (ref 80–99.9)
PDW BLD-RTO: 12.6 % (ref 11.5–15)
PLATELET # BLD: 313 K/UL (ref 130–450)
PMV BLD AUTO: 10.1 FL (ref 7–12)
POTASSIUM SERPL-SCNC: 5.1 MMOL/L (ref 3.5–5)
RBC # BLD: 4.71 M/UL (ref 3.8–5.8)
SODIUM BLD-SCNC: 136 MMOL/L (ref 132–146)
TOTAL PROTEIN: 7 G/DL (ref 6.4–8.3)
WBC # BLD: 8.6 K/UL (ref 4.5–11.5)

## 2025-01-17 RX ORDER — LISINOPRIL 10 MG/1
10 TABLET ORAL DAILY
Qty: 90 TABLET | Refills: 1 | Status: SHIPPED | OUTPATIENT
Start: 2025-01-17 | End: 2025-07-16

## 2025-01-17 RX ORDER — BUDESONIDE AND FORMOTEROL FUMARATE DIHYDRATE 160; 4.5 UG/1; UG/1
2 AEROSOL RESPIRATORY (INHALATION) 2 TIMES DAILY
Qty: 60.12 G | Refills: 1 | Status: SHIPPED | OUTPATIENT
Start: 2025-01-17

## 2025-01-17 RX ORDER — METOPROLOL TARTRATE 50 MG
50 TABLET ORAL 2 TIMES DAILY
Qty: 180 TABLET | Refills: 1 | Status: SHIPPED | OUTPATIENT
Start: 2025-01-17

## 2025-01-17 RX ORDER — AMLODIPINE BESYLATE 10 MG/1
10 TABLET ORAL DAILY
Qty: 90 TABLET | Refills: 1 | Status: SHIPPED | OUTPATIENT
Start: 2025-01-17 | End: 2025-07-16

## 2025-01-17 ASSESSMENT — PATIENT HEALTH QUESTIONNAIRE - PHQ9
1. LITTLE INTEREST OR PLEASURE IN DOING THINGS: NOT AT ALL
SUM OF ALL RESPONSES TO PHQ9 QUESTIONS 1 & 2: 0
SUM OF ALL RESPONSES TO PHQ QUESTIONS 1-9: 0
SUM OF ALL RESPONSES TO PHQ QUESTIONS 1-9: 0
2. FEELING DOWN, DEPRESSED OR HOPELESS: NOT AT ALL
SUM OF ALL RESPONSES TO PHQ QUESTIONS 1-9: 0
SUM OF ALL RESPONSES TO PHQ QUESTIONS 1-9: 0

## 2025-01-17 NOTE — PROGRESS NOTES
[facility] [place] [facility] PR[facility]  [address] [place] [address]  Dept: [phone]  Dept Fax: [phone]   DATE OF VISIT : 2025      Patient:  Patrick Padgett  Age: 74 y.o.       : 1950      Chief complaint:   Chief Complaint   Patient presents with    Hypertension     3 month         History of Present Illness     Patrick Padgett is a 74 y.o. male who presented to the clinic today for hypertension and COPD    On previous visit patient's blood pressure and COPD were both uncontrolled.  We did add Spiriva to his regimen and blood pressure has been well-controlled with combination of lisinopril, metoprolol and Norvasc.  Denies any headaches, dizziness or chest pain.  Overall breathing is well.  He reports feeling as if though he does not have any breathing issues.    Medication List:    Current Outpatient Medications   Medication Sig Dispense Refill    tiotropium (SPIRIVA RESPIMAT) 2.5 MCG/ACT AERS inhaler Inhale 2 puffs into the lungs daily 3 each 4    metoprolol tartrate (LOPRESSOR) 50 MG tablet Take 1 tablet by mouth 2 times daily 180 tablet 1    amLODIPine (NORVASC) 10 MG tablet Take 1 tablet by mouth daily 90 tablet 1    budesonide-formoterol (SYMBICORT) 160-4.5 MCG/ACT AERO Inhale 2 puffs into the lungs 2 times daily 60.12 g 1    lisinopril (PRINIVIL;ZESTRIL) 10 MG tablet Take 1 tablet by mouth daily 90 tablet 1    omeprazole 20 MG EC tablet Take 1 tablet by mouth 2 times daily 180 tablet 1    albuterol sulfate HFA (PROVENTIL;VENTOLIN;PROAIR) 108 (90 Base) MCG/ACT inhaler INHALE 2 PUFFS BY MOUTH AND INTO THE LUNGS FOUR TIMES A DAY IF NEEDED FOR WHEEZING 8 g 1     No current facility-administered medications for this visit.            ROS   Reviewed as above, otherwise negative       Physical Exam   Vitals:   Vitals:    25 0724   BP: 128/72   Pulse: 87   Temp: 98.7 °F (37.1 °C)   SpO2: 98%       Physical Exam  Vitals reviewed.   Constitutional:

## 2025-01-29 ENCOUNTER — OFFICE VISIT (OUTPATIENT)
Dept: FAMILY MEDICINE CLINIC | Age: 75
End: 2025-01-29

## 2025-01-29 VITALS
OXYGEN SATURATION: 88 % | WEIGHT: 156 LBS | HEART RATE: 98 BPM | SYSTOLIC BLOOD PRESSURE: 140 MMHG | BODY MASS INDEX: 24.48 KG/M2 | HEIGHT: 67 IN | DIASTOLIC BLOOD PRESSURE: 80 MMHG | TEMPERATURE: 98.9 F | RESPIRATION RATE: 18 BRPM

## 2025-01-29 DIAGNOSIS — R05.1 ACUTE COUGH: ICD-10-CM

## 2025-01-29 DIAGNOSIS — R06.02 SHORTNESS OF BREATH: ICD-10-CM

## 2025-01-29 DIAGNOSIS — R79.81 ABNORMAL PULSE OXIMETRY: ICD-10-CM

## 2025-01-29 DIAGNOSIS — J10.1 INFLUENZA A: Primary | ICD-10-CM

## 2025-01-29 LAB
INFLUENZA A ANTIBODY: POSITIVE
INFLUENZA B ANTIBODY: NEGATIVE
Lab: NORMAL
PERFORMING INSTRUMENT: NORMAL
QC PASS/FAIL: NORMAL
SARS-COV-2, POC: NORMAL

## 2025-01-29 RX ORDER — IPRATROPIUM BROMIDE AND ALBUTEROL SULFATE 2.5; .5 MG/3ML; MG/3ML
1 SOLUTION RESPIRATORY (INHALATION) ONCE
Status: COMPLETED | OUTPATIENT
Start: 2025-01-29 | End: 2025-01-29

## 2025-01-29 RX ADMIN — IPRATROPIUM BROMIDE AND ALBUTEROL SULFATE 1 DOSE: 2.5; .5 SOLUTION RESPIRATORY (INHALATION) at 13:57

## 2025-01-29 NOTE — PROGRESS NOTES
Right Ear: Tympanic membrane, ear canal and external ear normal.      Left Ear: Tympanic membrane, ear canal and external ear normal.      Nose: Rhinorrhea present.      Mouth/Throat:      Mouth: Mucous membranes are moist.      Pharynx: Oropharynx is clear. No oropharyngeal exudate or posterior oropharyngeal erythema.   Eyes:      Pupils: Pupils are equal, round, and reactive to light.   Cardiovascular:      Rate and Rhythm: Normal rate and regular rhythm.      Pulses: Normal pulses.   Pulmonary:      Effort: Pulmonary effort is normal.      Comments: Decreased air movement bilaterally, no wheezing or rhonchi  Abdominal:      General: Abdomen is flat.      Palpations: Abdomen is soft.   Musculoskeletal:      Cervical back: Normal range of motion.      Right lower leg: No edema.      Left lower leg: No edema.   Skin:     General: Skin is warm and dry.   Neurological:      General: No focal deficit present.      Mental Status: He is alert and oriented to person, place, and time.   Psychiatric:         Mood and Affect: Mood normal.         Behavior: Behavior normal.         Thought Content: Thought content normal.         Judgment: Judgment normal.           Lab / Imaging Results   (All laboratory and radiology results have been personally reviewed by myself)  Labs:  Results for orders placed or performed in visit on 01/29/25   POCT COVID-19, Antigen   Result Value Ref Range    SARS-COV-2, POC Not-Detected Not Detected    Lot Number [id number]     QC Pass/Fail p     Performing Instrument BD Veritor    POCT Influenza A/B   Result Value Ref Range    Influenza A Ab POSITIVE     Influenza B Ab negative        Imaging:  All Radiology results interpreted by Radiologist unless otherwise noted.  No results found.  Assessment/Plan  Patrick was seen today for congestion and cough.    Diagnoses and all orders for this visit:    Influenza A  -     POCT COVID-19, Antigen  -     POCT Influenza A/B  -     ipratropium 0.5 mg-albuterol 2.5

## 2025-03-18 ENCOUNTER — TELEPHONE (OUTPATIENT)
Dept: PHARMACY | Facility: CLINIC | Age: 75
End: 2025-03-18

## 2025-03-18 NOTE — TELEPHONE ENCOUNTER
[facility] CLINICAL PHARMACY: ADHERENCE REVIEW  Identified care gap per [place]: fills at [facility] : ACE/ARB adherence      ASSESSMENT    ACE/ARB ADHERENCE    Insurance Records claims through 3/10/25 (Prior Year PDC = not reported; YTD PDC = 89%; Potential Fail Date: 05/15/25):   Lisinopril 10mg tablet last filled on 25 for 30 day supply. Next refill due: 25    Prescribed si tablet/capsule daily    Per Insurer Portal: last filled on 25 for 30 day supply.     Per [facility]: not contacted    BP Readings from Last 3 Encounters:   25 (!) 140/80   25 128/72   10/18/24 (!) 150/62     Estimated Creatinine Clearance: 66 mL/min (based on SCr of 0.92 mg/dL).  Lab Results   Component Value Date    CREATININE 0.92 2025     Lab Results   Component Value Date    K 4.1 2025       PLAN    The following are interventions that have been identified:   Patient OVERDUE refilling Lisinopril 10mg tablet  and active on home medication list.   Patient NEEDS REFILLS for Lisinopril 10mg tablet  Patient eligible for 100 day supply of Lisinopril 10mg tablet     Attempting to reach patient to review.  Left message asking for return call.      Last Visit: 25  Next Visit: 25      [facility]   [facility] Clinical Pharmacy  [phone] Option 1     For Pharmacy Admin Tracking Only    Program: [employer]  CPA in place:  No  Gap Closed?: No   Time Spent (min): 5

## 2025-03-20 DIAGNOSIS — I10 ESSENTIAL HYPERTENSION: ICD-10-CM

## 2025-03-20 RX ORDER — AMLODIPINE BESYLATE 10 MG/1
10 TABLET ORAL DAILY
Qty: 100 TABLET | Refills: 1 | Status: SHIPPED | OUTPATIENT
Start: 2025-03-20

## 2025-03-20 RX ORDER — LISINOPRIL 10 MG/1
10 TABLET ORAL DAILY
Qty: 100 TABLET | Refills: 1 | Status: SHIPPED | OUTPATIENT
Start: 2025-03-20

## 2025-03-20 NOTE — TELEPHONE ENCOUNTER
Carter Donaldson MD - Refill request (100-day supply)     During an adherence call (encounter date 3/18/25), patient requested 100-day refills on the following medications:  Lisinopril 10 mg daily  Amlodipine 10 mg daily      Patient is eligible for a 100-day supply with [employer].      Requested that refills are sent to [facility] in [place], [place]:  [facility] - [place], [place] - [address] - [address] [phone] - F [phone]  [address]  [facility] [address]  Phone: [phone] Fax: [phone]        Prescriptions are pended for your signature/modification as appropriate.     Last Visit: 01/29/25  Next Visit: 07/18/25     Thank you,  Divya Sepulveda, PharmD, [facility]  Population Health Pharmacist  [facility] Clinical Pharmacy  Department, toll free: [phone], option 1

## 2025-03-20 NOTE — TELEPHONE ENCOUNTER
Patient returned call regarding adherence message below.  Verified patient taking lisinopril 10mg daily and amlodipine 10mg daily and would like both for 100DS sent to [facility] in [place], [place] [address], [place] [address].  Patient had been going to  because [facility] was no longer accepting [place] patients.  Now they are.    Thanked us for our assistance.    Will route to PharmD to facilitate refill authorizations.     Lissa Becker [facility].   Population Health Clinical   [facility] Clinical Pharmacy  Toll free: [phone] Option 1     For Pharmacy Admin Tracking Only    Program: [employer]  CPA in place:  No  Recommendation Provided To: Patient/Caregiver: 2 via Telephone  Intervention Detail: Adherence Monitorin  Intervention Accepted By: Patient/Caregiver: 2  Gap Closed?: Yes   Time Spent (min): 10

## 2025-03-21 NOTE — TELEPHONE ENCOUNTER
Left message for patient regarding refills for .  Also sent a letter.    Divya Sepulveda, PharmD, [facility]  Population Health Pharmacist  [facility] Clinical Pharmacy  Department, toll free: [phone], option 1      For Pharmacy Admin Tracking Only  Program: [employer]  CPA in place:  No  Recommendation Provided To: Provider: 2 via Note to Provider  Intervention Detail: Adherence Monitorin and New Rx: 2, reason: Improve Adherence, Patient Preference  Intervention Accepted By: Provider: 2  Gap Closed?: Yes   Time Spent (min): 30

## 2025-04-17 DIAGNOSIS — K21.9 GASTROESOPHAGEAL REFLUX DISEASE WITHOUT ESOPHAGITIS: ICD-10-CM

## 2025-04-17 RX ORDER — OMEPRAZOLE 20 MG/1
20 CAPSULE, DELAYED RELEASE ORAL 2 TIMES DAILY
Qty: 180 CAPSULE | Refills: 1 | Status: SHIPPED | OUTPATIENT
Start: 2025-04-17

## 2025-04-21 DIAGNOSIS — J44.9 COPD WITHOUT EXACERBATION (HCC): ICD-10-CM

## 2025-04-21 RX ORDER — BUDESONIDE AND FORMOTEROL FUMARATE DIHYDRATE 160; 4.5 UG/1; UG/1
2 AEROSOL RESPIRATORY (INHALATION) 2 TIMES DAILY
Qty: 60.12 G | Refills: 1 | Status: SHIPPED | OUTPATIENT
Start: 2025-04-21

## 2025-04-21 NOTE — TELEPHONE ENCOUNTER
Last Appointment:  1/17/2025  Future Appointments   Date Time Provider Department Center   7/18/2025  7:30 AM Carter Donaldson MD [facility] DEP   10/24/2025  7:30 AM Carter Donaldson MD [facility] DEP

## 2025-06-04 LAB — FIT, EXTERNAL: NORMAL

## 2025-07-18 ENCOUNTER — OFFICE VISIT (OUTPATIENT)
Dept: FAMILY MEDICINE CLINIC | Age: 75
End: 2025-07-18
Payer: MEDICARE

## 2025-07-18 VITALS
HEIGHT: 67 IN | HEART RATE: 70 BPM | SYSTOLIC BLOOD PRESSURE: 132 MMHG | TEMPERATURE: 99 F | WEIGHT: 154 LBS | BODY MASS INDEX: 24.17 KG/M2 | OXYGEN SATURATION: 98 % | DIASTOLIC BLOOD PRESSURE: 82 MMHG

## 2025-07-18 DIAGNOSIS — Z12.5 SCREENING PSA (PROSTATE SPECIFIC ANTIGEN): ICD-10-CM

## 2025-07-18 DIAGNOSIS — I10 ESSENTIAL HYPERTENSION: ICD-10-CM

## 2025-07-18 DIAGNOSIS — J44.9 COPD WITHOUT EXACERBATION (HCC): ICD-10-CM

## 2025-07-18 DIAGNOSIS — Z12.5 SCREENING PSA (PROSTATE SPECIFIC ANTIGEN): Primary | ICD-10-CM

## 2025-07-18 LAB
ALBUMIN: 4.5 G/DL (ref 3.5–5.2)
ALP BLD-CCNC: 49 U/L (ref 40–129)
ALT SERPL-CCNC: 16 U/L (ref 0–50)
ANION GAP SERPL CALCULATED.3IONS-SCNC: 14 MMOL/L (ref 7–16)
AST SERPL-CCNC: 28 U/L (ref 0–50)
BILIRUB SERPL-MCNC: 0.7 MG/DL (ref 0–1.2)
BUN BLDV-MCNC: 17 MG/DL (ref 8–23)
CALCIUM SERPL-MCNC: 10.3 MG/DL (ref 8.8–10.2)
CHLORIDE BLD-SCNC: 96 MMOL/L (ref 98–107)
CHOLESTEROL, TOTAL: 257 MG/DL
CO2: 26 MMOL/L (ref 22–29)
CREAT SERPL-MCNC: 1.1 MG/DL (ref 0.7–1.2)
GFR, ESTIMATED: 73 ML/MIN/1.73M2
GLUCOSE BLD-MCNC: 113 MG/DL (ref 74–99)
HCT VFR BLD CALC: 44.9 % (ref 37–54)
HDLC SERPL-MCNC: 77 MG/DL
HEMOGLOBIN: 15 G/DL (ref 12.5–16.5)
LDL CHOLESTEROL: 163 MG/DL
MCH RBC QN AUTO: 32.1 PG (ref 26–35)
MCHC RBC AUTO-ENTMCNC: 33.4 G/DL (ref 32–34.5)
MCV RBC AUTO: 96.1 FL (ref 80–99.9)
PDW BLD-RTO: 12.8 % (ref 11.5–15)
PLATELET # BLD: 233 K/UL (ref 130–450)
PMV BLD AUTO: 10.7 FL (ref 7–12)
POTASSIUM SERPL-SCNC: 5.6 MMOL/L (ref 3.5–5.1)
PROSTATE SPECIFIC ANTIGEN: 2.32 NG/ML (ref 0–4)
RBC # BLD: 4.67 M/UL (ref 3.8–5.8)
SODIUM BLD-SCNC: 136 MMOL/L (ref 136–145)
TOTAL PROTEIN: 6.8 G/DL (ref 6.4–8.3)
TRIGL SERPL-MCNC: 84 MG/DL
VLDLC SERPL CALC-MCNC: 17 MG/DL
WBC # BLD: 8 K/UL (ref 4.5–11.5)

## 2025-07-18 PROCEDURE — 3078F DIAST BP <80 MM HG: CPT | Performed by: STUDENT IN AN ORGANIZED HEALTH CARE EDUCATION/TRAINING PROGRAM

## 2025-07-18 PROCEDURE — G2211 COMPLEX E/M VISIT ADD ON: HCPCS | Performed by: STUDENT IN AN ORGANIZED HEALTH CARE EDUCATION/TRAINING PROGRAM

## 2025-07-18 PROCEDURE — 1123F ACP DISCUSS/DSCN MKR DOCD: CPT | Performed by: STUDENT IN AN ORGANIZED HEALTH CARE EDUCATION/TRAINING PROGRAM

## 2025-07-18 PROCEDURE — 3075F SYST BP GE 130 - 139MM HG: CPT | Performed by: STUDENT IN AN ORGANIZED HEALTH CARE EDUCATION/TRAINING PROGRAM

## 2025-07-18 PROCEDURE — 99214 OFFICE O/P EST MOD 30 MIN: CPT | Performed by: STUDENT IN AN ORGANIZED HEALTH CARE EDUCATION/TRAINING PROGRAM

## 2025-07-18 RX ORDER — ALBUTEROL SULFATE 90 UG/1
2 INHALANT RESPIRATORY (INHALATION) EVERY 6 HOURS PRN
Qty: 8 G | Refills: 1 | Status: SHIPPED | OUTPATIENT
Start: 2025-07-18

## 2025-07-18 RX ORDER — METOPROLOL TARTRATE 50 MG
50 TABLET ORAL 2 TIMES DAILY
Qty: 180 TABLET | Refills: 1 | Status: SHIPPED | OUTPATIENT
Start: 2025-07-18

## 2025-07-18 RX ORDER — BUDESONIDE AND FORMOTEROL FUMARATE DIHYDRATE 160; 4.5 UG/1; UG/1
2 AEROSOL RESPIRATORY (INHALATION) 2 TIMES DAILY
Qty: 60.12 G | Refills: 1 | Status: SHIPPED | OUTPATIENT
Start: 2025-07-18

## 2025-07-18 NOTE — PROGRESS NOTES
[facility] CARE  [address] [place] [address]  Dept: [phone]  Dept Fax: [phone]   DATE OF VISIT : 2025      Patient:  Patrick Padgett  Age: 74 y.o.       : 1950      Chief complaint:   Chief Complaint   Patient presents with    COPD    Hypertension         History of Present Illness       History of Present Illness  The patient presents for hypertension, skin spots, and respiratory issues.    He reports no respiratory issues and is currently using Spiriva, which he finds effective. He also has another inhaler for use as needed, but he is unsure of its name.    He is on 3 blood pressure medications. His blood pressure was 156 systolic today, previously it was in the 140s and 128 systolic. He reports no headaches or dizziness. He does not experience frequent urination.    He reports some itchiness.      Medication List:    Current Outpatient Medications   Medication Sig Dispense Refill    tiotropium (SPIRIVA RESPIMAT) 2.5 MCG/ACT AERS inhaler Inhale 2 puffs into the lungs daily 3 each 4    metoprolol tartrate (LOPRESSOR) 50 MG tablet Take 1 tablet by mouth 2 times daily 180 tablet 1    budesonide-formoterol (SYMBICORT) 160-4.5 MCG/ACT AERO Inhale 2 puffs into the lungs 2 times daily 60.12 g 1    albuterol sulfate HFA (PROVENTIL;VENTOLIN;PROAIR) 108 (90 Base) MCG/ACT inhaler Inhale 2 puffs into the lungs every 6 hours as needed for Wheezing 8 g 1    omeprazole (PRILOSEC) 20 MG delayed release capsule TAKE 1 CAPSULE BY MOUTH TWICE DAILY 180 capsule 1    lisinopril (PRINIVIL;ZESTRIL) 10 MG tablet Take 1 tablet by mouth daily 100 tablet 1    amLODIPine (NORVASC) 10 MG tablet Take 1 tablet by mouth daily 100 tablet 1    omeprazole (PRILOSEC) 20 MG delayed release capsule Take 1 capsule by mouth 2 times daily       No current facility-administered medications for this visit.            ROS   Reviewed as above, otherwise negative

## 2025-07-23 DIAGNOSIS — K21.9 GASTROESOPHAGEAL REFLUX DISEASE WITHOUT ESOPHAGITIS: ICD-10-CM

## 2025-07-23 RX ORDER — OMEPRAZOLE 20 MG/1
20 CAPSULE, DELAYED RELEASE ORAL 2 TIMES DAILY
Qty: 180 CAPSULE | Refills: 1 | Status: SHIPPED | OUTPATIENT
Start: 2025-07-23

## 2025-07-23 NOTE — TELEPHONE ENCOUNTER
Name of Medication(s) Requested:  Requested Prescriptions     Pending Prescriptions Disp Refills    omeprazole (PRILOSEC) 20 MG delayed release capsule 180 capsule 1     Sig: Take 1 capsule by mouth 2 times daily       Medication is on current medication list Yes    Dosage and directions were verified? Yes    Quantity verified: 90 day supply     Pharmacy Verified?  Yes    Last Appointment:  7/18/2025    Future appts:  Future Appointments   Date Time Provider Department Center   10/24/2025  7:30 AM Carter Donaldson MD [facility] DEP   1/16/2026  8:00 AM Carter Donaldson MD [facility] DE[facility]        (If no appt send self scheduling link. .REFILLAPPT)  Scheduling request sent?     [] Yes  [x] No    Does patient need updated?  [] Yes  [x] No